# Patient Record
Sex: MALE | Race: WHITE | Employment: FULL TIME | URBAN - METROPOLITAN AREA
[De-identification: names, ages, dates, MRNs, and addresses within clinical notes are randomized per-mention and may not be internally consistent; named-entity substitution may affect disease eponyms.]

---

## 2019-12-25 ENCOUNTER — HOSPITAL ENCOUNTER (EMERGENCY)
Age: 49
Discharge: HOME | End: 2019-12-25
Attending: EMERGENCY MEDICINE
Payer: COMMERCIAL

## 2019-12-25 ENCOUNTER — APPOINTMENT (OUTPATIENT)
Dept: CT IMAGING | Age: 49
End: 2019-12-25
Payer: COMMERCIAL

## 2019-12-25 VITALS
HEIGHT: 75 IN | WEIGHT: 235 LBS | BODY MASS INDEX: 29.22 KG/M2 | TEMPERATURE: 97.8 F | RESPIRATION RATE: 16 BRPM | SYSTOLIC BLOOD PRESSURE: 140 MMHG | OXYGEN SATURATION: 98 % | DIASTOLIC BLOOD PRESSURE: 91 MMHG | HEART RATE: 100 BPM

## 2019-12-25 DIAGNOSIS — N20.1 URETERIC STONE: Primary | ICD-10-CM

## 2019-12-25 LAB
ALBUMIN SERPL-MCNC: 4.6 G/DL (ref 3.5–5.2)
ALP BLD-CCNC: 75 U/L (ref 40–129)
ALT SERPL-CCNC: 26 U/L (ref 0–40)
ANION GAP SERPL CALCULATED.3IONS-SCNC: 10 MMOL/L (ref 7–16)
AST SERPL-CCNC: 22 U/L (ref 0–39)
BACTERIA: NORMAL /HPF
BASOPHILS ABSOLUTE: 0.03 E9/L (ref 0–0.2)
BASOPHILS RELATIVE PERCENT: 0.3 % (ref 0–2)
BILIRUB SERPL-MCNC: 0.8 MG/DL (ref 0–1.2)
BILIRUBIN URINE: NEGATIVE
BLOOD, URINE: ABNORMAL
BUN BLDV-MCNC: 17 MG/DL (ref 6–20)
CALCIUM SERPL-MCNC: 10.1 MG/DL (ref 8.6–10.2)
CHLORIDE BLD-SCNC: 101 MMOL/L (ref 98–107)
CLARITY: CLEAR
CO2: 26 MMOL/L (ref 22–29)
COLOR: YELLOW
CREAT SERPL-MCNC: 1.4 MG/DL (ref 0.7–1.2)
EOSINOPHILS ABSOLUTE: 0.04 E9/L (ref 0.05–0.5)
EOSINOPHILS RELATIVE PERCENT: 0.4 % (ref 0–6)
GFR AFRICAN AMERICAN: >60
GFR NON-AFRICAN AMERICAN: 54 ML/MIN/1.73
GLUCOSE BLD-MCNC: 124 MG/DL (ref 74–99)
GLUCOSE URINE: NEGATIVE MG/DL
HCT VFR BLD CALC: 48.5 % (ref 37–54)
HEMOGLOBIN: 16.5 G/DL (ref 12.5–16.5)
IMMATURE GRANULOCYTES #: 0.03 E9/L
IMMATURE GRANULOCYTES %: 0.3 % (ref 0–5)
INFLUENZA A BY PCR: NOT DETECTED
INFLUENZA B BY PCR: NOT DETECTED
KETONES, URINE: NEGATIVE MG/DL
LEUKOCYTE ESTERASE, URINE: NEGATIVE
LYMPHOCYTES ABSOLUTE: 0.81 E9/L (ref 1.5–4)
LYMPHOCYTES RELATIVE PERCENT: 7.8 % (ref 20–42)
MCH RBC QN AUTO: 31.3 PG (ref 26–35)
MCHC RBC AUTO-ENTMCNC: 34 % (ref 32–34.5)
MCV RBC AUTO: 92 FL (ref 80–99.9)
MONOCYTES ABSOLUTE: 0.42 E9/L (ref 0.1–0.95)
MONOCYTES RELATIVE PERCENT: 4 % (ref 2–12)
NEUTROPHILS ABSOLUTE: 9.09 E9/L (ref 1.8–7.3)
NEUTROPHILS RELATIVE PERCENT: 87.2 % (ref 43–80)
NITRITE, URINE: NEGATIVE
PDW BLD-RTO: 12.9 FL (ref 11.5–15)
PH UA: 6 (ref 5–9)
PLATELET # BLD: 249 E9/L (ref 130–450)
PMV BLD AUTO: 10.2 FL (ref 7–12)
POTASSIUM SERPL-SCNC: 4 MMOL/L (ref 3.5–5)
PROTEIN UA: NEGATIVE MG/DL
RBC # BLD: 5.27 E12/L (ref 3.8–5.8)
RBC UA: NORMAL /HPF (ref 0–2)
SODIUM BLD-SCNC: 137 MMOL/L (ref 132–146)
SPECIFIC GRAVITY UA: <=1.005 (ref 1–1.03)
TOTAL PROTEIN: 7.4 G/DL (ref 6.4–8.3)
UROBILINOGEN, URINE: 0.2 E.U./DL
WBC # BLD: 10.4 E9/L (ref 4.5–11.5)
WBC UA: NORMAL /HPF (ref 0–5)

## 2019-12-25 PROCEDURE — 80053 COMPREHEN METABOLIC PANEL: CPT

## 2019-12-25 PROCEDURE — 2580000003 HC RX 258: Performed by: EMERGENCY MEDICINE

## 2019-12-25 PROCEDURE — 85025 COMPLETE CBC W/AUTO DIFF WBC: CPT

## 2019-12-25 PROCEDURE — 74176 CT ABD & PELVIS W/O CONTRAST: CPT

## 2019-12-25 PROCEDURE — 87088 URINE BACTERIA CULTURE: CPT

## 2019-12-25 PROCEDURE — 87502 INFLUENZA DNA AMP PROBE: CPT

## 2019-12-25 PROCEDURE — 99284 EMERGENCY DEPT VISIT MOD MDM: CPT

## 2019-12-25 PROCEDURE — 36415 COLL VENOUS BLD VENIPUNCTURE: CPT

## 2019-12-25 PROCEDURE — 81001 URINALYSIS AUTO W/SCOPE: CPT

## 2019-12-25 RX ORDER — 0.9 % SODIUM CHLORIDE 0.9 %
1000 INTRAVENOUS SOLUTION INTRAVENOUS ONCE
Status: COMPLETED | OUTPATIENT
Start: 2019-12-25 | End: 2019-12-25

## 2019-12-25 RX ORDER — IBUPROFEN 800 MG/1
800 TABLET ORAL EVERY 8 HOURS PRN
Qty: 21 TABLET | Refills: 0 | Status: SHIPPED | OUTPATIENT
Start: 2019-12-25 | End: 2022-06-14 | Stop reason: ALTCHOICE

## 2019-12-25 RX ORDER — SODIUM CHLORIDE 0.9 % (FLUSH) 0.9 %
10 SYRINGE (ML) INJECTION PRN
Status: DISCONTINUED | OUTPATIENT
Start: 2019-12-25 | End: 2019-12-25 | Stop reason: HOSPADM

## 2019-12-25 RX ORDER — ONDANSETRON 4 MG/1
4 TABLET, ORALLY DISINTEGRATING ORAL EVERY 8 HOURS PRN
Qty: 10 TABLET | Refills: 0 | Status: SHIPPED | OUTPATIENT
Start: 2019-12-25 | End: 2020-03-03

## 2019-12-25 RX ORDER — TAMSULOSIN HYDROCHLORIDE 0.4 MG/1
0.4 CAPSULE ORAL DAILY
Qty: 14 CAPSULE | Refills: 0 | Status: SHIPPED | OUTPATIENT
Start: 2019-12-25 | End: 2020-03-03

## 2019-12-25 RX ADMIN — SODIUM CHLORIDE 1000 ML: 9 INJECTION, SOLUTION INTRAVENOUS at 17:43

## 2019-12-25 ASSESSMENT — ENCOUNTER SYMPTOMS
EYE DISCHARGE: 0
SINUS PRESSURE: 0
ABDOMINAL PAIN: 1
SHORTNESS OF BREATH: 0
NAUSEA: 0
WHEEZING: 0
SORE THROAT: 0
EYE PAIN: 0
BACK PAIN: 0
DIARRHEA: 0
COUGH: 0
CONSTIPATION: 0
BELCHING: 0
EYE REDNESS: 0
VOMITING: 0

## 2019-12-28 LAB — URINE CULTURE, ROUTINE: NORMAL

## 2020-01-02 ENCOUNTER — HOSPITAL ENCOUNTER (OUTPATIENT)
Age: 50
Discharge: HOME | End: 2020-01-04
Payer: COMMERCIAL

## 2020-01-02 LAB
CREAT SERPL-MCNC: 1.1 MG/DL (ref 0.7–1.2)
GFR AFRICAN AMERICAN: >60
GFR NON-AFRICAN AMERICAN: >60 ML/MIN/1.73

## 2020-01-02 PROCEDURE — 82565 ASSAY OF CREATININE: CPT

## 2020-03-03 ENCOUNTER — OFFICE VISIT (OUTPATIENT)
Dept: FAMILY MEDICINE CLINIC | Age: 50
End: 2020-03-03
Payer: COMMERCIAL

## 2020-03-03 VITALS
BODY MASS INDEX: 28.7 KG/M2 | SYSTOLIC BLOOD PRESSURE: 122 MMHG | DIASTOLIC BLOOD PRESSURE: 62 MMHG | WEIGHT: 230.8 LBS | OXYGEN SATURATION: 96 % | HEART RATE: 106 BPM | HEIGHT: 75 IN | TEMPERATURE: 98.3 F

## 2020-03-03 LAB
INFLUENZA A ANTIBODY: NEGATIVE
INFLUENZA B ANTIBODY: NEGATIVE

## 2020-03-03 PROCEDURE — 87804 INFLUENZA ASSAY W/OPTIC: CPT | Performed by: PHYSICIAN ASSISTANT

## 2020-03-03 PROCEDURE — 99213 OFFICE O/P EST LOW 20 MIN: CPT | Performed by: PHYSICIAN ASSISTANT

## 2020-03-03 RX ORDER — OSELTAMIVIR PHOSPHATE 75 MG/1
75 CAPSULE ORAL DAILY
Qty: 14 CAPSULE | Refills: 0 | Status: SHIPPED | OUTPATIENT
Start: 2020-03-03 | End: 2020-03-17

## 2020-03-03 RX ORDER — CEFUROXIME AXETIL 250 MG/1
250 TABLET ORAL 2 TIMES DAILY
Qty: 20 TABLET | Refills: 0 | Status: SHIPPED | OUTPATIENT
Start: 2020-03-03 | End: 2020-03-13

## 2020-03-03 ASSESSMENT — ENCOUNTER SYMPTOMS
COUGH: 1
SORE THROAT: 1
SINUS PAIN: 1
EYES NEGATIVE: 1
GASTROINTESTINAL NEGATIVE: 1
SINUS PRESSURE: 1

## 2020-03-03 NOTE — PROGRESS NOTES
Chief Complaint   Patient presents with    Chills    Fever     101.4 last night    Cough    Fatigue    Other     brother hs the flu and they spent sunday together        HPI:  Patient presents today for sore throat ,fever, cough, achiness for 1 day. Brother had the flu and  spent Sunday together. Current Outpatient Medications:     cefUROXime (CEFTIN) 250 MG tablet, Take 1 tablet by mouth 2 times daily for 10 days, Disp: 20 tablet, Rfl: 0    oseltamivir (TAMIFLU) 75 MG capsule, Take 1 capsule by mouth daily for 14 days, Disp: 14 capsule, Rfl: 0    ibuprofen (IBU) 800 MG tablet, Take 1 tablet by mouth every 8 hours as needed for Pain, Disp: 21 tablet, Rfl: 0    aspirin-acetaminophen-caffeine (EXCEDRIN MIGRAINE) 250-250-65 MG per tablet, Take 2 tablets by mouth once.  , Disp: , Rfl:        Allergies   Allergen Reactions    Other Swelling     bees         Review of Systems  Review of Systems   Constitutional: Positive for chills, fatigue and fever. HENT: Positive for congestion, sinus pressure, sinus pain and sore throat. Eyes: Negative. Respiratory: Positive for cough. Cardiovascular: Negative. Gastrointestinal: Negative. VS:  /62 (Site: Right Upper Arm, Position: Sitting)   Pulse 106   Temp 98.3 °F (36.8 °C) (Temporal)   Ht 6' 3\" (1.905 m)   Wt 230 lb 12.8 oz (104.7 kg)   SpO2 96%   BMI 28.85 kg/m²     Patient's medical, social, and family history reviewed      Physical Exam  Physical Exam  Vitals signs and nursing note reviewed. Constitutional:       General: He is not in acute distress. Appearance: Normal appearance. He is normal weight. He is not toxic-appearing. HENT:      Head: Normocephalic.       Right Ear: Tympanic membrane, ear canal and external ear normal.      Left Ear: Tympanic membrane, ear canal and external ear normal.      Nose: Nose normal.      Mouth/Throat:      Mouth: Mucous membranes are moist.      Pharynx: Oropharyngeal exudate and

## 2022-06-06 ENCOUNTER — TELEPHONE (OUTPATIENT)
Dept: PRIMARY CARE CLINIC | Age: 52
End: 2022-06-06

## 2022-06-06 NOTE — TELEPHONE ENCOUNTER
----- Message from Eris Hussein sent at 2022 12:41 PM EDT -----  Subject: Appointment Request    Reason for Call: New Patient Request Appointment    QUESTIONS  Type of Appointment? New Patient/New to Provider  Reason for appointment request? No appointments available during search  Additional Information for Provider? Patient's sons are patients of Dr. Cate Foster and Anne Felix. Jennifer Rodriguez is their dad and wants to establish   with Dr. Vj Lynne also. Not seeing any availability. Please let him know if   this is possible.   ---------------------------------------------------------------------------  --------------  CALL BACK INFO  What is the best way for the office to contact you? OK to leave message on   voicemail  Preferred Call Back Phone Number? [phone]  ---------------------------------------------------------------------------  --------------  SCRIPT ANSWERS  Relationship to Patient? Self  Specialty Confirmation? Primary Care  Is this the first appointment to establish care for a ? No  Have you been diagnosed with, awaiting test results for, or told that you   are suspected of having COVID-19 (Coronavirus)? (If patient has tested   negative or was tested as a requirement for work, school, or travel and   not based on symptoms, answer no)? No  Within the past 10 days have you developed any of the following symptoms   (answer no if symptoms have been present longer than 10 days or began   more than 10 days ago)? Fever or Chills, Cough, Shortness of breath or   difficulty breathing, Loss of taste or smell, Sore throat, Nasal   congestion, Sneezing or runny nose, Fatigue or generalized body aches   (answer no if pain is specific to a body part e.g. back pain), Diarrhea,   Headache? No  Have you had close contact with someone with COVID-19 in the last 7 days? No  (Service Expert  click yes below to proceed with [email or web address] As Usual   Scheduling)?  Yes

## 2022-06-14 ENCOUNTER — OFFICE VISIT (OUTPATIENT)
Dept: PRIMARY CARE CLINIC | Age: 52
End: 2022-06-14
Payer: COMMERCIAL

## 2022-06-14 VITALS
WEIGHT: 230 LBS | TEMPERATURE: 98.1 F | HEIGHT: 75 IN | HEART RATE: 75 BPM | SYSTOLIC BLOOD PRESSURE: 128 MMHG | DIASTOLIC BLOOD PRESSURE: 74 MMHG | BODY MASS INDEX: 28.6 KG/M2 | OXYGEN SATURATION: 98 %

## 2022-06-14 DIAGNOSIS — R10.32 LEFT GROIN PAIN: ICD-10-CM

## 2022-06-14 DIAGNOSIS — Z12.5 SCREENING FOR MALIGNANT NEOPLASM OF PROSTATE: ICD-10-CM

## 2022-06-14 DIAGNOSIS — K40.90 LEFT INGUINAL HERNIA: ICD-10-CM

## 2022-06-14 DIAGNOSIS — Z00.01 ENCOUNTER FOR GENERAL ADULT MEDICAL EXAMINATION WITH ABNORMAL FINDINGS: Primary | ICD-10-CM

## 2022-06-14 DIAGNOSIS — Z12.11 SCREENING FOR MALIGNANT NEOPLASM OF COLON: ICD-10-CM

## 2022-06-14 PROCEDURE — 99396 PREV VISIT EST AGE 40-64: CPT | Performed by: FAMILY MEDICINE

## 2022-06-14 ASSESSMENT — ENCOUNTER SYMPTOMS
ABDOMINAL PAIN: 0
COLOR CHANGE: 0
DIARRHEA: 0
CHEST TIGHTNESS: 0
RHINORRHEA: 0
WHEEZING: 0
SHORTNESS OF BREATH: 0
NAUSEA: 0
SINUS PRESSURE: 0
COUGH: 0
EYE PAIN: 0
EYE REDNESS: 0
SORE THROAT: 0
BACK PAIN: 0
CONSTIPATION: 0
BLOOD IN STOOL: 0
EYE ITCHING: 0
VOMITING: 0
APNEA: 0

## 2022-06-14 ASSESSMENT — PATIENT HEALTH QUESTIONNAIRE - PHQ9
SUM OF ALL RESPONSES TO PHQ QUESTIONS 1-9: 0
SUM OF ALL RESPONSES TO PHQ QUESTIONS 1-9: 0
SUM OF ALL RESPONSES TO PHQ9 QUESTIONS 1 & 2: 0
1. LITTLE INTEREST OR PLEASURE IN DOING THINGS: 0
SUM OF ALL RESPONSES TO PHQ QUESTIONS 1-9: 0
SUM OF ALL RESPONSES TO PHQ QUESTIONS 1-9: 0
2. FEELING DOWN, DEPRESSED OR HOPELESS: 0

## 2022-06-14 NOTE — PROGRESS NOTES
Chief Complaint:     Chief Complaint   Patient presents with    Annual Exam    New Patient     Feels well  Healthy  Appetite good  No change in bowel or bladder function  Vaccines UTD    Groin Pain  The patient's primary symptoms include pelvic pain. The patient's pertinent negatives include no genital itching, genital lesions or scrotal swelling. This is a recurrent problem. The current episode started more than 1 month ago. The problem occurs intermittently. The problem has been waxing and waning. The pain is mild. Pertinent negatives include no abdominal pain, chest pain, chills, constipation, coughing, diarrhea, discolored urine, dysuria, fever, frequency, headaches, hematuria, hesitancy, joint pain, joint swelling, nausea, rash, shortness of breath, sore throat, urgency, urinary retention or vomiting. The symptoms are aggravated by heavy lifting and activity. He has tried nothing for the symptoms. The treatment provided no relief. He is sexually active. There is no problem list on file for this patient. Past Medical History:   Diagnosis Date    Migraines        Past Surgical History:   Procedure Laterality Date    VASECTOMY         No current outpatient medications on file. No current facility-administered medications for this visit.        Allergies   Allergen Reactions    Other Swelling     bees       Social History     Socioeconomic History    Marital status:      Spouse name: None    Number of children: None    Years of education: None    Highest education level: None   Occupational History    None   Tobacco Use    Smoking status: Never Smoker    Smokeless tobacco: Never Used   Vaping Use    Vaping Use: Never used   Substance and Sexual Activity    Alcohol use: Yes     Comment: occasional    Drug use: No    Sexual activity: Yes     Partners: Male   Other Topics Concern    None   Social History Narrative    None     Social Determinants of Health     Financial Resource Strain:     Difficulty of Paying Living Expenses: Not on file   Food Insecurity:     Worried About Running Out of Food in the Last Year: Not on file    [place] of Food in the Last Year: Not on file   Transportation Needs:     Lack of Transportation (Medical): Not on file    Lack of Transportation (Non-Medical): Not on file   Physical Activity:     Days of Exercise per Week: Not on file    Minutes of Exercise per Session: Not on file   Stress:     Feeling of Stress : Not on file   Social Connections:     Frequency of Communication with Friends and Family: Not on file    Frequency of Social Gatherings with Friends and Family: Not on file    Attends Worship Services: Not on file    Active Member of [address] [place] [address] or Organizations: Not on file    Attends Club or Organization Meetings: Not on file    Marital Status: Not on file   Intimate Partner Violence:     Fear of Current or Ex-Partner: Not on file    Emotionally Abused: Not on file    Physically Abused: Not on file    Sexually Abused: Not on file   Housing Stability:     Unable to Pay for Housing in the Last Year: Not on file    Number of [place] in the Last Year: Not on file    Unstable Housing in the Last Year: Not on file       History reviewed. No pertinent family history. Review of Systems   Constitutional: Negative for activity change, appetite change, chills, fatigue and fever. HENT: Negative for congestion, ear pain, hearing loss, nosebleeds, rhinorrhea, sinus pressure and sore throat. Eyes: Negative for pain, redness, itching and visual disturbance. Respiratory: Negative for apnea, cough, chest tightness, shortness of breath and wheezing. Cardiovascular: Negative for chest pain, palpitations and leg swelling. Gastrointestinal: Negative for abdominal pain, blood in stool, constipation, diarrhea, nausea and vomiting. Endocrine: Negative. Genitourinary: Positive for pelvic pain.  Negative for decreased urine volume, difficulty urinating, dysuria, frequency, hematuria, hesitancy, scrotal swelling and urgency. Musculoskeletal: Negative for arthralgias, back pain, gait problem, joint pain, myalgias and neck pain. Skin: Negative for color change and rash. Allergic/Immunologic: Negative for environmental allergies and food allergies. Neurological: Negative for dizziness, weakness, light-headedness, numbness and headaches. Hematological: Negative for adenopathy. Does not bruise/bleed easily. Psychiatric/Behavioral: Negative for behavioral problems, dysphoric mood and sleep disturbance. The patient is not nervous/anxious and is not hyperactive. All other systems reviewed and are negative. /74   Pulse 75   Temp 98.1 °F (36.7 °C)   Ht 6' 3\" (1.905 m)   Wt 230 lb (104.3 kg)   SpO2 98%   BMI 28.75 kg/m²     Physical Exam  Vitals and nursing note reviewed. Constitutional:       General: He is not in acute distress. Appearance: Normal appearance. He is well-developed. HENT:      Head: Normocephalic and atraumatic. Right Ear: Hearing, tympanic membrane and external ear normal. No tenderness. No middle ear effusion. Left Ear: Hearing, tympanic membrane and external ear normal. No tenderness. No middle ear effusion. Nose: Nose normal. No congestion or rhinorrhea. Right Turbinates: Not enlarged. Left Turbinates: Not enlarged. Mouth/Throat:      Mouth: Mucous membranes are moist.      Tongue: No lesions. Pharynx: Oropharynx is clear. No oropharyngeal exudate or posterior oropharyngeal erythema. Eyes:      General: No scleral icterus. Conjunctiva/sclera: Conjunctivae normal.      Pupils: Pupils are equal, round, and reactive to light. Neck:      Thyroid: No thyromegaly. Cardiovascular:      Rate and Rhythm: Normal rate and regular rhythm. Heart sounds: Normal heart sounds. No murmur heard.       Pulmonary:      Effort: Pulmonary effort is normal. No respiratory distress. Breath sounds: Normal breath sounds. No wheezing or rales. Abdominal:      General: Bowel sounds are normal. There is no distension. Palpations: Abdomen is soft. Tenderness: There is no abdominal tenderness. Musculoskeletal:         General: No tenderness. Normal range of motion. Cervical back: Normal range of motion and neck supple. No rigidity. No muscular tenderness. Lymphadenopathy:      Cervical: No cervical adenopathy. Skin:     General: Skin is warm and dry. Findings: No erythema or rash. Neurological:      General: No focal deficit present. Mental Status: He is alert and oriented to person, place, and time. Cranial Nerves: No cranial nerve deficit. Deep Tendon Reflexes: Reflexes are normal and symmetric. Reflexes normal.   Psychiatric:         Mood and Affect: Mood normal.                                 ASSESSMENT/PLAN:    There is no problem list on file for this patient. Sergio Alvarez was seen today for annual exam and new patient. Diagnoses and all orders for this visit:    Encounter for general adult medical examination with abnormal findings  -     Comprehensive Metabolic Panel; Future  -     Lipid Panel; Future    Screening for malignant neoplasm of colon  -     [address], MD, General Surgery, [facility]    Left groin pain    Left inguinal hernia  -     [address], MD, General Surgery, [facility]    Screening for malignant neoplasm of prostate  -     PSA Screening; Future          Return in about 1 year (around 6/14/2023) for Annual Exam.      I spent 30 minutes with this patient. I spent greater than 50% of the time counseling this patient.         Pavan Toussaint DO  6/14/2022  1:13 PM

## 2022-06-27 DIAGNOSIS — Z00.01 ENCOUNTER FOR GENERAL ADULT MEDICAL EXAMINATION WITH ABNORMAL FINDINGS: ICD-10-CM

## 2022-06-27 DIAGNOSIS — Z12.5 SCREENING FOR MALIGNANT NEOPLASM OF PROSTATE: ICD-10-CM

## 2022-06-27 LAB
ALBUMIN SERPL-MCNC: 4.6 G/DL (ref 3.5–5.2)
ALP BLD-CCNC: 83 U/L (ref 40–129)
ALT SERPL-CCNC: 18 U/L (ref 0–40)
ANION GAP SERPL CALCULATED.3IONS-SCNC: 12 MMOL/L (ref 7–16)
AST SERPL-CCNC: 19 U/L (ref 0–39)
BILIRUB SERPL-MCNC: 0.7 MG/DL (ref 0–1.2)
BUN BLDV-MCNC: 19 MG/DL (ref 6–20)
CALCIUM SERPL-MCNC: 9.5 MG/DL (ref 8.6–10.2)
CHLORIDE BLD-SCNC: 106 MMOL/L (ref 98–107)
CHOLESTEROL, TOTAL: 145 MG/DL (ref 0–199)
CO2: 23 MMOL/L (ref 22–29)
CREAT SERPL-MCNC: 1.1 MG/DL (ref 0.7–1.2)
GFR AFRICAN AMERICAN: >60
GFR NON-AFRICAN AMERICAN: >60 ML/MIN/1.73
GLUCOSE BLD-MCNC: 90 MG/DL (ref 74–99)
HDLC SERPL-MCNC: 44 MG/DL
LDL CHOLESTEROL CALCULATED: 82 MG/DL (ref 0–99)
POTASSIUM SERPL-SCNC: 4.3 MMOL/L (ref 3.5–5)
PROSTATE SPECIFIC ANTIGEN: 3.51 NG/ML (ref 0–4)
SODIUM BLD-SCNC: 141 MMOL/L (ref 132–146)
TOTAL PROTEIN: 7.2 G/DL (ref 6.4–8.3)
TRIGL SERPL-MCNC: 95 MG/DL (ref 0–149)
VLDLC SERPL CALC-MCNC: 19 MG/DL

## 2022-06-30 DIAGNOSIS — R97.20 ELEVATED PSA: Primary | ICD-10-CM

## 2022-07-26 ENCOUNTER — OFFICE VISIT (OUTPATIENT)
Dept: SURGERY | Age: 52
End: 2022-07-26
Payer: COMMERCIAL

## 2022-07-26 VITALS
RESPIRATION RATE: 18 BRPM | BODY MASS INDEX: 28.35 KG/M2 | TEMPERATURE: 97.3 F | SYSTOLIC BLOOD PRESSURE: 124 MMHG | HEIGHT: 75 IN | OXYGEN SATURATION: 99 % | DIASTOLIC BLOOD PRESSURE: 82 MMHG | HEART RATE: 84 BPM | WEIGHT: 228 LBS

## 2022-07-26 DIAGNOSIS — Z12.11 ENCOUNTER FOR SCREENING COLONOSCOPY: ICD-10-CM

## 2022-07-26 DIAGNOSIS — K40.20 NON-RECURRENT BILATERAL INGUINAL HERNIA WITHOUT OBSTRUCTION OR GANGRENE: Primary | ICD-10-CM

## 2022-07-26 PROCEDURE — 99203 OFFICE O/P NEW LOW 30 MIN: CPT | Performed by: SURGERY

## 2022-07-26 NOTE — PROGRESS NOTES
[facility] Surgery Clinic Note    Assessment/Plan:      Diagnosis Orders   1. Non-recurrent bilateral inguinal hernia without obstruction or gangrene      We will plan for repair      2. Encounter for screening colonoscopy      Plan for colonoscopy. Left inguinal hernia likely colon containing. We will fix hernias prior to colonoscopy to decrease risk of incomplete colonoscopy            Return for Surgery, Colonoscopy. Chief Complaint   Patient presents with    New Patient     Colonoscopy consult        PCP: Monie Garcia DO    HPI: Lb Atwood is a 47 y.o. male who presents in consultation for evaluation of left inguinal hernia. He thinks he has had it for about 6 to 8 months. He felt some discomfort there. Last couple months he is felt a bulge. He thinks the other side is starting with similar symptoms but denies any bulging. He says the left side will reduce if he lays down he can put pressure on it. Activity makes his discomfort worse. He is also due for colonoscopy. He has not had one previously. He denies any issues moving his bowels. He has no diarrhea or constipation. There is no melena or hematochezia. He has no abdominal pain or unintentional weight loss aside from the discomfort from his hernia. There is no family history of colon cancer or inflammatory bowel disease.       Past Medical History:   Diagnosis Date    Migraines        Past Surgical History:   Procedure Laterality Date    VASECTOMY         Prior to Admission medications    Not on File       Allergies   Allergen Reactions    Other Swelling     bees       Social History     Socioeconomic History    Marital status:      Spouse name: None    Number of children: None    Years of education: None    Highest education level: None   Tobacco Use    Smoking status: Never    Smokeless tobacco: Never   Vaping Use    Vaping Use: Never used   Substance and Sexual Activity    Alcohol use: Yes     Comment: occasional    Drug use: No    Sexual activity: Yes     Partners: Male       No family history on file. Review of Systems   All other systems reviewed and are negative. Objective:  Vitals:    07/26/22 1243   BP: 124/82   Pulse: 84   Resp: 18   Temp: 97.3 °F (36.3 °C)   TempSrc: Temporal   SpO2: 99%   Weight: 228 lb (103.4 kg)   Height: 6' 3\" (1.905 m)          Physical Exam  Constitutional:       General: He is not in acute distress. Appearance: He is not diaphoretic. HENT:      Head: Normocephalic and atraumatic. Eyes:      General:         Right eye: No discharge. Left eye: No discharge. Neck:      Trachea: No tracheal deviation. Cardiovascular:      Rate and Rhythm: Normal rate. Pulmonary:      Effort: Pulmonary effort is normal. No respiratory distress. Abdominal:      General: There is no distension. Palpations: Abdomen is soft. Tenderness: There is no abdominal tenderness. There is no guarding or rebound. Comments: Bilateral inguinal hernia, left greater than right. Likely colon containing on the left   Skin:     General: Skin is warm and dry. Neurological:      Mental Status: He is alert and oriented to person, place, and time. Lan Leo MD      NOTE: This report, in part or full,may have been transcribed using voice recognition software. Every effort was made to ensure accuracy; however, inadvertent computerized transcription errors may be present. Please excuse any transcriptional grammatical or spelling errors that may have escaped my editorial review.     CC: Iva Kong,

## 2022-07-28 ENCOUNTER — TELEPHONE (OUTPATIENT)
Dept: SURGERY | Age: 52
End: 2022-07-28

## 2022-07-28 ENCOUNTER — HOSPITAL ENCOUNTER (EMERGENCY)
Age: 52
Discharge: HOME | End: 2022-07-29
Attending: STUDENT IN AN ORGANIZED HEALTH CARE EDUCATION/TRAINING PROGRAM
Payer: COMMERCIAL

## 2022-07-28 DIAGNOSIS — F10.920 ACUTE ALCOHOLIC INTOXICATION WITHOUT COMPLICATION (HCC): Primary | ICD-10-CM

## 2022-07-28 LAB
CHP ED QC CHECK: YES
GLUCOSE BLD-MCNC: 114 MG/DL
METER GLUCOSE: 114 MG/DL (ref 74–99)

## 2022-07-28 PROCEDURE — 99284 EMERGENCY DEPT VISIT MOD MDM: CPT

## 2022-07-28 PROCEDURE — 82962 GLUCOSE BLOOD TEST: CPT

## 2022-07-28 NOTE — TELEPHONE ENCOUNTER
Houston Gordon is scheduled for laparoscopic robotic assisted bilateral inguinal repair with mesh with Dr Tanisha Palmer on 08-17-22 at [facility] at 8:30 am. Patient was told to arrive at 6:30 am. Patient needs to be NPO after midnight the night before procedure. All surgery instructions were explained to the patient and a surgery letter was also mailed out. MA informed patient that PAT will also be calling to review pre-op instructions and medications. Patient verbalized understanding.   Electronically signed by Vero Clemons MA on 7/28/2022 at 10:05 AM

## 2022-07-28 NOTE — TELEPHONE ENCOUNTER
Renelle Romberg is scheduled for colonoscopy with Dr Nirmal Siu on 11-2-22 at [facility] at 11:00 am. Patient was told to arrive at 10:00 am. Patient needs to be NPO after midnight the night before procedure. All surgery instructions were explained to the patient and a surgery letter was also mailed out. MA informed patient that PAT will also be calling to review pre-op instructions and medications. Patient verbalized understanding.   Electronically signed by Donny Ortega MA on 7/28/2022 at 10:31 AM

## 2022-07-28 NOTE — TELEPHONE ENCOUNTER
Markus Kitchen is scheduled for colonoscopy with Dr Donita Peña on 11-29-22 at [facility] at 11:00 am. Patient was told to arrive at 10:00 am. Patient needs to be NPO after midnight the night before procedure. All surgery instructions were explained to the patient and a surgery letter was also mailed out. MA informed patient that PAT will also be calling to review pre-op instructions and medications. Patient verbalized understanding.   Electronically signed by Radha Mari MA on 7/28/2022 at 10:48 AM

## 2022-07-29 VITALS
WEIGHT: 200 LBS | DIASTOLIC BLOOD PRESSURE: 83 MMHG | TEMPERATURE: 97.6 F | HEIGHT: 72 IN | HEART RATE: 78 BPM | RESPIRATION RATE: 14 BRPM | OXYGEN SATURATION: 95 % | SYSTOLIC BLOOD PRESSURE: 113 MMHG | BODY MASS INDEX: 27.09 KG/M2

## 2022-07-29 NOTE — ED PROVIDER NOTES
ED  Provider Note  Admit Date/RoomTime: 7/28/2022  8:57 PM  ED Room: 13/13      History of Present Illness:  7/28/22, Time: 9:08 PM EDT  Chief Complaint   Patient presents with    Alcohol Intoxication     Pt presents to ED intoxicated and responsive to pain. According to family pt was drinking heavily today and was out in the sun. Taryn Crowe is a 47 y.o. male presenting to the ED for alcohol intoxication. Patient easily arousable now, arouses to verbal, states that he drank too much to dose today while golfing, concern for dehydration. Denies any coingestions, no drugs including marijuana. Denies any fall or head strike, no focal weakness or numbness, no neck pain or stiffness, no chest pain or shortness of breath, no abdominal pain, no nausea or vomiting, no diarrhea or constipation. .  Exacerbated by excessive alcohol consumption, relieved by none. Review of Systems:   A complete review of systems was performed and pertinent positives and negatives are stated within HPI, all other systems reviewed and are negative.        --------------------------------------------- PATIENT HISTORY--------------------------------------------  Past Medical History:  has a past medical history of Migraines. Past Surgical History:  has a past surgical history that includes Vasectomy. Family History: family history is not on file. . Unless otherwise noted, family history is non contributory    Social History:  reports that he has never smoked. He has never used smokeless tobacco. He reports current alcohol use. He reports that he does not use drugs. The patients home medications have been reviewed. Allergies:  Other    I have reviewed the past medical history, past surgical history, social history, and family history    ---------------------------------------------------PHYSICAL EXAM--------------------------------------    Constitutional/General: Alert and oriented x3, arousable to voice   Head: Normocephalic and atraumatic  Eyes: PERRL, EOMI, sclera non icteric  ENT: Oropharynx clear, handling secretions, no trismus  Neck: Supple, full ROM, no stridor, no meningismus  Respiratory: lctab  Cardiovascular: RRR, no R/G/M, 2+ peripheral pulses  Chest: No chest wall tenderness, equal chest rise  Gastrointestinal:  sntnd  Musculoskeletal: Extremities warm and well perfused, moving all extremities  Skin: skin warm and dry. No rashes. Neurologic: No focal deficits, strength and sensation grossly intact   Psychiatric: Normal Affect, behavior normal           -------------------------------------------------- RESULTS -------------------------------------------------  I have personally reviewed all laboratory and imaging results for this patient. Results are listed below. LABS: (Lab results interpreted by me)  Results for orders placed or performed during the hospital encounter of 07/28/22   POCT glucose   Result Value Ref Range    Glucose 114 mg/dL    QC OK? yes    POCT Glucose   Result Value Ref Range    Meter Glucose 114 (H) 74 - 99 mg/dL   ,       RADIOLOGY:  Imaging interpreted by Radiologist unless otherwise specified  No orders to display       ------------------------- NURSING NOTES AND VITALS REVIEWED ---------------------------  The nursing notes within the ED encounter and vital signs as below have been reviewed by myself  /75   Pulse 75   Temp 97.6 °F (36.4 °C) (Temporal)   Resp 14   Ht 6' (1.829 m)   Wt 200 lb (90.7 kg)   SpO2 96%   BMI 27.12 kg/m²      The patients available past medical records and past encounters were reviewed. ------------------------------ ED COURSE/MEDICAL DECISION MAKING----------------------  Medications - No data to display    I, Dr. Orinda Cabot, am the primary provider of record    Medical Decision Making:   Alcohol intoxication. Vitals stable. No fall or trauma. Patient will be monitored while they metabolize their alcohol ingestion. BG wnl.  No co-ingestions or AMS. ED Counseling: This emergency provider has spoken with the patient and any family present to discuss clinical status, results, plan of care, diagnosis and prognosis as able to be determined at this time. Any questions were answered and patient and/or family/POA are agreeable with the plan.       --------------------------------- IMPRESSION AND DISPOSITION ---------------------------------    IMPRESSION  1. Acute alcoholic intoxication without complication ([facility] [address]        DISPOSITION  Disposition: Other Disposition: discharge to home  Patient condition is good      This report was transcribed using voice recognition software. Every effort was made to ensure accuracy; however, transcription errors may be present.          Pb Medel MD  07/29/22 8452

## 2022-07-29 NOTE — ED NOTES
Report to [facility] RN, care of patient relinquished.       Dorothy Belcher RN  07/28/22 6144

## 2022-08-15 NOTE — PROGRESS NOTES
[facility] PRE-ADMISSION TESTING INSTRUCTIONS    The Preadmission Testing patient is instructed accordingly using the following criteria (check applicable):    ARRIVAL INSTRUCTIONS:  [x] Parking the day of Surgery is located in the Main Entrance lot. Upon entering the door, make an immediate right to the surgery reception desk    [x] Bring photo ID and insurance card    [] Bring in a copy of Living will or Durable Power of  papers. [x] Please be sure to arrange for responsible adult to provide transportation to and from the hospital    [x] Please arrange for responsible adult to be with you for the 24 hour period post procedure due to having anesthesia    [x] If you awake am of surgery not feeling well or have temperature >100 please call [phone]    GENERAL INSTRUCTIONS:    [x] Nothing by mouth after midnight, including gum, candy, mints or water    [x] You may brush your teeth, but do not swallow any water    [] Take medications as instructed with 1-2 oz of water    [] Stop herbal supplements and vitamins 5 days prior to procedure    [] Follow preop dosing of blood thinners per physician instructions    [] Take 1/2 dose of evening insulin, but no insulin after midnight    [] No oral diabetic medications after midnight    [] If diabetic and have low blood sugar or feel symptomatic, take 1-2oz apple juice only    [] Bring inhalers day of surgery    [] Bring C-PAP/ Bi-Pap day of surgery    [] Bring urine specimen day of surgery    [x] Shower or bath with soap, lather and rinse well, AM of Surgery, no lotion, powders or creams to surgical site    [] Follow bowel prep as instructed per surgeon    [x] No tobacco products within 24 hours of surgery     [x] No alcohol or illegal drug use within 24 hours of surgery.     [x] Jewelry, body piercing's, eyeglasses, contact lenses and dentures are not permitted into surgery (bring cases)      [x] Please do not wear any nail polish, make up or hair products on the day of surgery    [x] You can expect a call the business day prior to procedure to notify you if your arrival time changes    [x] If you receive a survey after surgery we would greatly appreciate your comments    [] Parent/guardian of a minor must accompany their child and remain on the premises  the entire time they are under our care     [] Pediatric patients may bring favorite toy, blanket or comfort item with them    [] A caregiver or family member must remain with the patient during their stay if they are mentally handicapped, have dementia, disoriented or unable to use a call light or would be a safety concern if left unattended    [x] Please notify surgeon if you develop any illness between now and time of surgery (cold, cough, sore throat, fever, nausea, vomiting) or any signs of infections  including skin, wounds, and dental.    [x]  The Outpatient Pharmacy is available to fill your prescription here on your day of surgery, ask your preop nurse for details    [] Other instructions    EDUCATIONAL MATERIALS PROVIDED:    [] PAT Preoperative Education Packet/Booklet     [] Medication List    [] Transfusion bracelet applied with instructions    [] Shower with soap, lather and rinse well, and use CHG wipes provided the evening before surgery as instructed    [] Incentive spirometer with instructions

## 2022-08-16 ENCOUNTER — ANESTHESIA EVENT (OUTPATIENT)
Dept: OPERATING ROOM | Age: 52
End: 2022-08-16
Payer: COMMERCIAL

## 2022-08-17 ENCOUNTER — HOSPITAL ENCOUNTER (OUTPATIENT)
Age: 52
Setting detail: OUTPATIENT SURGERY
Discharge: HOME | End: 2022-08-17
Attending: SURGERY | Admitting: SURGERY
Payer: COMMERCIAL

## 2022-08-17 ENCOUNTER — ANESTHESIA (OUTPATIENT)
Dept: OPERATING ROOM | Age: 52
End: 2022-08-17
Payer: COMMERCIAL

## 2022-08-17 VITALS
WEIGHT: 220 LBS | SYSTOLIC BLOOD PRESSURE: 130 MMHG | OXYGEN SATURATION: 97 % | TEMPERATURE: 97.2 F | HEIGHT: 75 IN | RESPIRATION RATE: 16 BRPM | HEART RATE: 78 BPM | BODY MASS INDEX: 27.35 KG/M2 | DIASTOLIC BLOOD PRESSURE: 90 MMHG

## 2022-08-17 DIAGNOSIS — G89.18 POST-OP PAIN: Primary | ICD-10-CM

## 2022-08-17 PROCEDURE — 49650 LAP ING HERNIA REPAIR INIT: CPT | Performed by: SURGERY

## 2022-08-17 PROCEDURE — S2900 ROBOTIC SURGICAL SYSTEM: HCPCS | Performed by: SURGERY

## 2022-08-17 PROCEDURE — 2500000003 HC RX 250 WO HCPCS: Performed by: NURSE ANESTHETIST, CERTIFIED REGISTERED

## 2022-08-17 PROCEDURE — 7100000011 HC PHASE II RECOVERY - ADDTL 15 MIN: Performed by: SURGERY

## 2022-08-17 PROCEDURE — 3600000019 HC SURGERY ROBOT ADDTL 15MIN: Performed by: SURGERY

## 2022-08-17 PROCEDURE — 7100000010 HC PHASE II RECOVERY - FIRST 15 MIN: Performed by: SURGERY

## 2022-08-17 PROCEDURE — 2580000003 HC RX 258: Performed by: SURGERY

## 2022-08-17 PROCEDURE — 2709999900 HC NON-CHARGEABLE SUPPLY: Performed by: SURGERY

## 2022-08-17 PROCEDURE — 3700000000 HC ANESTHESIA ATTENDED CARE: Performed by: SURGERY

## 2022-08-17 PROCEDURE — C1781 MESH (IMPLANTABLE): HCPCS | Performed by: SURGERY

## 2022-08-17 PROCEDURE — 2500000003 HC RX 250 WO HCPCS: Performed by: SURGERY

## 2022-08-17 PROCEDURE — 3700000001 HC ADD 15 MINUTES (ANESTHESIA): Performed by: SURGERY

## 2022-08-17 PROCEDURE — 2580000003 HC RX 258: Performed by: NURSE ANESTHETIST, CERTIFIED REGISTERED

## 2022-08-17 PROCEDURE — 7100000000 HC PACU RECOVERY - FIRST 15 MIN: Performed by: SURGERY

## 2022-08-17 PROCEDURE — 3600000009 HC SURGERY ROBOT BASE: Performed by: SURGERY

## 2022-08-17 PROCEDURE — 6360000002 HC RX W HCPCS: Performed by: NURSE ANESTHETIST, CERTIFIED REGISTERED

## 2022-08-17 PROCEDURE — 6360000002 HC RX W HCPCS: Performed by: SURGERY

## 2022-08-17 PROCEDURE — 7100000001 HC PACU RECOVERY - ADDTL 15 MIN: Performed by: SURGERY

## 2022-08-17 DEVICE — MESH HERN LAP SELF FIXATING LT ANAT POLYLACTIC ACID PROGRIP: Type: IMPLANTABLE DEVICE | Site: INGUINAL | Status: FUNCTIONAL

## 2022-08-17 DEVICE — MESH HERN W15XL10CM TEXTILE MFIL POLYETH TEREPHTHALATE: Type: IMPLANTABLE DEVICE | Site: INGUINAL | Status: FUNCTIONAL

## 2022-08-17 RX ORDER — LABETALOL HYDROCHLORIDE 5 MG/ML
INJECTION, SOLUTION INTRAVENOUS PRN
Status: DISCONTINUED | OUTPATIENT
Start: 2022-08-17 | End: 2022-08-17 | Stop reason: SDUPTHER

## 2022-08-17 RX ORDER — PROPOFOL 10 MG/ML
INJECTION, EMULSION INTRAVENOUS PRN
Status: DISCONTINUED | OUTPATIENT
Start: 2022-08-17 | End: 2022-08-17 | Stop reason: SDUPTHER

## 2022-08-17 RX ORDER — KETOROLAC TROMETHAMINE 30 MG/ML
INJECTION, SOLUTION INTRAMUSCULAR; INTRAVENOUS PRN
Status: DISCONTINUED | OUTPATIENT
Start: 2022-08-17 | End: 2022-08-17 | Stop reason: SDUPTHER

## 2022-08-17 RX ORDER — ONDANSETRON 2 MG/ML
INJECTION INTRAMUSCULAR; INTRAVENOUS PRN
Status: DISCONTINUED | OUTPATIENT
Start: 2022-08-17 | End: 2022-08-17 | Stop reason: SDUPTHER

## 2022-08-17 RX ORDER — KETAMINE HYDROCHLORIDE 10 MG/ML
INJECTION, SOLUTION INTRAMUSCULAR; INTRAVENOUS PRN
Status: DISCONTINUED | OUTPATIENT
Start: 2022-08-17 | End: 2022-08-17 | Stop reason: SDUPTHER

## 2022-08-17 RX ORDER — SODIUM CHLORIDE 9 MG/ML
INJECTION, SOLUTION INTRAVENOUS CONTINUOUS PRN
Status: DISCONTINUED | OUTPATIENT
Start: 2022-08-17 | End: 2022-08-17 | Stop reason: SDUPTHER

## 2022-08-17 RX ORDER — MIDAZOLAM HYDROCHLORIDE 1 MG/ML
INJECTION INTRAMUSCULAR; INTRAVENOUS PRN
Status: DISCONTINUED | OUTPATIENT
Start: 2022-08-17 | End: 2022-08-17 | Stop reason: SDUPTHER

## 2022-08-17 RX ORDER — FENTANYL CITRATE 50 UG/ML
INJECTION, SOLUTION INTRAMUSCULAR; INTRAVENOUS PRN
Status: DISCONTINUED | OUTPATIENT
Start: 2022-08-17 | End: 2022-08-17 | Stop reason: SDUPTHER

## 2022-08-17 RX ORDER — NEOSTIGMINE METHYLSULFATE 1 MG/ML
INJECTION, SOLUTION INTRAVENOUS PRN
Status: DISCONTINUED | OUTPATIENT
Start: 2022-08-17 | End: 2022-08-17 | Stop reason: SDUPTHER

## 2022-08-17 RX ORDER — ROCURONIUM BROMIDE 10 MG/ML
INJECTION, SOLUTION INTRAVENOUS PRN
Status: DISCONTINUED | OUTPATIENT
Start: 2022-08-17 | End: 2022-08-17 | Stop reason: SDUPTHER

## 2022-08-17 RX ORDER — BUPIVACAINE HYDROCHLORIDE AND EPINEPHRINE 2.5; 5 MG/ML; UG/ML
INJECTION, SOLUTION EPIDURAL; INFILTRATION; INTRACAUDAL; PERINEURAL PRN
Status: DISCONTINUED | OUTPATIENT
Start: 2022-08-17 | End: 2022-08-17 | Stop reason: ALTCHOICE

## 2022-08-17 RX ORDER — DEXAMETHASONE SODIUM PHOSPHATE 4 MG/ML
INJECTION, SOLUTION INTRA-ARTICULAR; INTRALESIONAL; INTRAMUSCULAR; INTRAVENOUS; SOFT TISSUE PRN
Status: DISCONTINUED | OUTPATIENT
Start: 2022-08-17 | End: 2022-08-17 | Stop reason: SDUPTHER

## 2022-08-17 RX ORDER — OXYCODONE HYDROCHLORIDE 5 MG/1
5 TABLET ORAL EVERY 6 HOURS PRN
Qty: 12 TABLET | Refills: 0 | Status: SHIPPED | OUTPATIENT
Start: 2022-08-17 | End: 2022-08-20

## 2022-08-17 RX ORDER — SODIUM CHLORIDE, SODIUM LACTATE, POTASSIUM CHLORIDE, CALCIUM CHLORIDE 600; 310; 30; 20 MG/100ML; MG/100ML; MG/100ML; MG/100ML
INJECTION, SOLUTION INTRAVENOUS CONTINUOUS PRN
Status: DISCONTINUED | OUTPATIENT
Start: 2022-08-17 | End: 2022-08-17 | Stop reason: SDUPTHER

## 2022-08-17 RX ORDER — GLYCOPYRROLATE 0.2 MG/ML
INJECTION INTRAMUSCULAR; INTRAVENOUS PRN
Status: DISCONTINUED | OUTPATIENT
Start: 2022-08-17 | End: 2022-08-17 | Stop reason: SDUPTHER

## 2022-08-17 RX ADMIN — ROCURONIUM BROMIDE 50 MG: 10 INJECTION, SOLUTION INTRAVENOUS at 08:09

## 2022-08-17 RX ADMIN — FENTANYL CITRATE 100 MCG: 50 INJECTION, SOLUTION INTRAMUSCULAR; INTRAVENOUS at 08:17

## 2022-08-17 RX ADMIN — SODIUM CHLORIDE, POTASSIUM CHLORIDE, SODIUM LACTATE AND CALCIUM CHLORIDE: 600; 310; 30; 20 INJECTION, SOLUTION INTRAVENOUS at 08:57

## 2022-08-17 RX ADMIN — PROPOFOL 200 MG: 10 INJECTION, EMULSION INTRAVENOUS at 08:08

## 2022-08-17 RX ADMIN — KETAMINE HYDROCHLORIDE 20 MG: 10 INJECTION INTRAMUSCULAR; INTRAVENOUS at 08:08

## 2022-08-17 RX ADMIN — SODIUM CHLORIDE: 9 INJECTION, SOLUTION INTRAVENOUS at 08:17

## 2022-08-17 RX ADMIN — ROCURONIUM BROMIDE 10 MG: 10 INJECTION, SOLUTION INTRAVENOUS at 09:00

## 2022-08-17 RX ADMIN — DEXAMETHASONE SODIUM PHOSPHATE 8 MG: 4 INJECTION, SOLUTION INTRAMUSCULAR; INTRAVENOUS at 08:15

## 2022-08-17 RX ADMIN — KETOROLAC TROMETHAMINE 30 MG: 30 INJECTION, SOLUTION INTRAMUSCULAR at 10:06

## 2022-08-17 RX ADMIN — ONDANSETRON 4 MG: 2 INJECTION INTRAMUSCULAR; INTRAVENOUS at 09:34

## 2022-08-17 RX ADMIN — FENTANYL CITRATE 100 MCG: 50 INJECTION, SOLUTION INTRAMUSCULAR; INTRAVENOUS at 08:06

## 2022-08-17 RX ADMIN — CEFAZOLIN 2000 MG: 2 INJECTION, POWDER, FOR SOLUTION INTRAMUSCULAR; INTRAVENOUS at 08:04

## 2022-08-17 RX ADMIN — Medication 3 MG: at 09:51

## 2022-08-17 RX ADMIN — MIDAZOLAM 2 MG: 1 INJECTION INTRAMUSCULAR; INTRAVENOUS at 07:59

## 2022-08-17 RX ADMIN — GLYCOPYRROLATE 0.6 MG: 0.2 INJECTION INTRAMUSCULAR; INTRAVENOUS at 09:51

## 2022-08-17 RX ADMIN — LABETALOL HYDROCHLORIDE 2.5 MG: 5 INJECTION INTRAVENOUS at 09:13

## 2022-08-17 ASSESSMENT — PAIN - FUNCTIONAL ASSESSMENT: PAIN_FUNCTIONAL_ASSESSMENT: 0-10

## 2022-08-17 ASSESSMENT — PAIN SCALES - GENERAL
PAINLEVEL_OUTOF10: 2
PAINLEVEL_OUTOF10: 0

## 2022-08-17 ASSESSMENT — LIFESTYLE VARIABLES: SMOKING_STATUS: 0

## 2022-08-17 NOTE — OP NOTE
Operative Note      Patient: Roselyn Silverio  YOB: 1970  MRN: [id number]    Date of Procedure: 8/17/2022    Pre-Op Diagnosis: Bilateral inguinal hernia without obstruction or gangrene, recurrence not specified [K40.20]    Post-Op Diagnosis: Left-sided large indirect inguinal hernia with fat-containing femoral hernia, large right-sided direct fat-containing inguinal hernia. Procedure(s):  LAPAROSCOPIC ROBOTIC XI ASSISTED BILATERAL INGUINAL HERNIA REPAIR WITH MESH    Surgeon(s):  Laveta Barthel, MD    Assistant:   Resident: Zulema Fernandez MD; Makayla Mojica MD    Anesthesia: General    Estimated Blood Loss (mL): Minimal    Complications: None    Specimens:   * No specimens in log *    Implants:  Implant Name Type Inv. Item Serial No.  Lot No. LRB No. Used Action   MESH ZULAY [id number] TEXTILE MFIL POLYETH TEREPHTHALATE - [id number]  [address] [place], [place] [id number] Right 1 Implanted   MESH ZULAY LAP SELF FIXATING LT NATALIIA POLYLACTIC ACID PROGRIP - [id number]  MESH ZULAY LAP SELF FIXATING LT NATALIIA POLYLACTIC ACID PROGRIP  [employer] US SURGICAL-WD [id number] Left 1 Implanted         Drains:   [REMOVED] Urinary Catheter Wall (Removed)       Findings: Uncomplicated robotic bilateral inguinal hernia repair as stated above    Indications: Patient is a 47 y.o. male who was diagnosed with Bilateral Inguinal Hernia. Risks/Benefits/Alternatives were discussed with the patient, including bleeding, infection, iatrogenic injury to surrounding structures, nerve/vascular injury, recurrence, need for further operations. The patient agreed to undergo the proceed and informed consent was obtained. Procedure: After informed consent, the patient was brought to the operating room and placed Supine. General anesthesia was then induced which the patient tolerated well. Time out was performed to identify the correct patient and procedure. They received appropriate perioperative antibiotics. The abdomen and genitalia were prepped and draped in the usual sterile fashion. Pneumoperitoneum was created with Veress in the left upper quadrant at the site of one of the proposed robotic trocars and 8mm robotic optiview port used in this location for abdominal entry. In the midline between umbilicus and epigastrium, incision was made and 8 mm port was placed into the abdomen. Additional 8 millimeter port was placed under direct visualization in the RUQ. The robot was then brought up to the table and docked. Camera and working instruments are then inserted and directed down to the Left lower quadrant. The defect was  lateral  to the epigastric vessels. The hernia defect was noted to be extremely large without incarceration. The peritoneum was then incised at the level ASIS from lateral to medial. Our preperitoneal working space was then created with blunt dissection as well as scissor cautery. This was carried down to the level of the pubic tubercle medially and to the ASIS laterally. Saul's ligament was identified and cleared off medially. The hernia sac and cord structures were identified. Hernia sac was dissected free from the cord structures. Next identified a fat containing femoral hernia that was reduced in similar fashion using blunt dissection. Once the hernia sac was circumferentially dissected off the cord structures without any tenting, we were ready to place our mesh. Hemostasis was ensured with electrocautery. The Progrip mesh was then inserted and flattened along the myopectineal orifice. We had good coverage of the entire area with peritoneum dissected away from the inferior edge of the mesh. 2-0 V lock suture was then used to sew the peritoneum back together in place. We then turned our attention to the right inguinal hernia. The hernia defect was noted to be medial to the epigastric vessels.   The peritoneum was then incised at the level of the ASIS from lateral to medial using scissor cautery. A preperitoneal working space was created with a combination of blunt dissection as well as scissor cautery. This was carried down to the level of the pubic tubercle medially and the ASIS laterally. Saul's ligament was identified and cleared off medially we are able to see our dissection from the left-sided dissection. The hernia sac and cord structures were identified. The hernia sac was dissected free from the defect and cord structures and reduced into the abdomen. Once the hernia sac was circumferentially dissected off the cord structures without any tenting, we are ready to place our mesh. Hemostasis was ensured with electrocautery. The ProGrip mesh was then inserted and flattened along the myopectineal orifice. We had good coverage of the entire area with peritoneum dissected away from the inferior edge of her mesh. 2-0 V lock suture was then used to sew the peritoneum back in place. During this dissection we had a rent in her peritoneum this was closed using 3-0 Vicryl suture. Pneumoperitoneum was released. Ports were removed and the skin incisions were closed with 4-0 Monocryl suture. Skin glue was applied. Patient tolerated procedure well without any complications. Both testicles were noted to be within the scrotum and a jockstrap was placed at the end of the case. Needle, sponge, instrument count were correct x2. Dr. Shirley Sutherland was present and scrubbed for the procedure. Electronically signed by Akilah Hernandez MD on 8/17/2022 at 10:09 AM    Attestation:    I was present during the procedure and participated in all key aspects.     Susan Erwin MD  08/17/22  7:53 PM

## 2022-08-17 NOTE — ANESTHESIA PRE PROCEDURE
Department of Anesthesiology  Preprocedure Note       Name:  Mina Vega   Age:  47 y.o.  :  1970                                          MRN:  [id number]         Date:  2022      Surgeon: Ashok Gaspar):  Maria Antonia Fraire MD    Procedure: Procedure(s):  LAPAROSCOPIC ROBOTIC XI ASSISTED BILATERAL INGUINAL HERNIA REPAIR WITH MESH    Medications prior to admission:   Prior to Admission medications    Not on File       Current medications:    Current Facility-Administered Medications   Medication Dose Route Frequency Provider Last Rate Last Admin    ceFAZolin (ANCEF) 2,000 mg in sterile water 20 mL IV syringe  2,000 mg IntraVENous On Call to [address] [place] [address], MD           Allergies: Allergies   Allergen Reactions    Other Swelling     bees       Problem List:  There is no problem list on file for this patient. Past Medical History:        Diagnosis Date    Bilateral inguinal hernia     For OR 22       Past Surgical History:        Procedure Laterality Date    VASECTOMY         Social History:    Social History     Tobacco Use    Smoking status: Never    Smokeless tobacco: Never   Substance Use Topics    Alcohol use: Yes     Comment: occasional                                Counseling given: Not Answered      Vital Signs (Current):   Vitals:    08/15/22 1431   Weight: 220 lb (99.8 kg)   Height: 6' 3\" (1.905 m)                                              BP Readings from Last 3 Encounters:   22 113/83   22 124/82   22 128/74       NPO Status:                                                                                 BMI:   Wt Readings from Last 3 Encounters:   08/15/22 220 lb (99.8 kg)   22 200 lb (90.7 kg)   22 228 lb (103.4 kg)     Body mass index is 27.5 kg/m².     CBC:   Lab Results   Component Value Date/Time    WBC 10.4 2019 05:35 PM    RBC 5.27 2019 05:35 PM    HGB 16.5 2019 05:35 PM    HCT 48.5 2019 05:35 PM    MCV 92.0 12/25/2019 05:35 PM    RDW 12.9 12/25/2019 05:35 PM     12/25/2019 05:35 PM       CMP:   Lab Results   Component Value Date/Time     06/27/2022 07:12 AM    K 4.3 06/27/2022 07:12 AM     06/27/2022 07:12 AM    CO2 23 06/27/2022 07:12 AM    BUN 19 06/27/2022 07:12 AM    CREATININE 1.1 06/27/2022 07:12 AM    GFRAA >60 06/27/2022 07:12 AM    LABGLOM >60 06/27/2022 07:12 AM    GLUCOSE 114 07/28/2022 10:36 PM    PROT 7.2 06/27/2022 07:12 AM    CALCIUM 9.5 06/27/2022 07:12 AM    BILITOT 0.7 06/27/2022 07:12 AM    ALKPHOS 83 06/27/2022 07:12 AM    AST 19 06/27/2022 07:12 AM    ALT 18 06/27/2022 07:12 AM       POC Tests: No results for input(s): POCGLU, POCNA, POCK, POCCL, POCBUN, POCHEMO, POCHCT in the last 72 hours. Coags: No results found for: PROTIME, INR, APTT    HCG (If Applicable): No results found for: PREGTESTUR, PREGSERUM, HCG, HCGQUANT     ABGs: No results found for: PHART, PO2ART, CKX3IXB, NPV5KZS, BEART, A1QMQWYA     Type & Screen (If Applicable):  No results found for: LABABO, LABRH    Drug/Infectious Status (If Applicable):  No results found for: HIV, HEPCAB    COVID-19 Screening (If Applicable): No results found for: COVID19        Anesthesia Evaluation  Patient summary reviewed and Nursing notes reviewed no history of anesthetic complications:   Airway: Mallampati: II  TM distance: >3 FB   Neck ROM: full  Mouth opening: > = 3 FB   Dental:    (+) implants      Pulmonary:Negative Pulmonary ROS breath sounds clear to auscultation      (-) not a current smoker                           Cardiovascular:Negative CV ROS  Exercise tolerance: good (>4 METS),           Rhythm: regular  Rate: normal           Beta Blocker:  Not on Beta Blocker         Neuro/Psych:   Negative Neuro/Psych ROS              GI/Hepatic/Renal:            ROS comment: bph. Endo/Other: Negative Endo/Other ROS                    Abdominal:             Vascular: negative vascular ROS.          Other Findings: Anesthesia Plan      general     ASA 2       Induction: intravenous. Anesthetic plan and risks discussed with patient.                         Juvenal Panda MD   8/17/2022       note reviewed and agree with plan MARTHA Lemon - CRNA

## 2022-08-17 NOTE — ANESTHESIA POSTPROCEDURE EVALUATION
Department of Anesthesiology  Postprocedure Note    Patient: Klaudia Jacobs  MRN: [id number]  YOB: 1970  Date of evaluation: 8/17/2022      Procedure Summary     Date: 08/17/22 Room / Location: [facility] OR 10 / [facility]    Anesthesia Start: 0802 Anesthesia Stop: 8995    Procedure: Rebekah (Bilateral: Abdomen) Diagnosis:       Bilateral inguinal hernia without obstruction or gangrene, recurrence not specified      (Bilateral inguinal hernia without obstruction or gangrene, recurrence not specified [K40.20])    Surgeons: Michael Velasquez MD Responsible Provider: Chiqui De La Cruz MD    Anesthesia Type: general ASA Status: 2          Anesthesia Type: No value filed.     Sanjiv Phase I: Sanjiv Score: 10    Sanjiv Phase II:        Anesthesia Post Evaluation    Patient location during evaluation: PACU  Patient participation: complete - patient participated  Level of consciousness: awake and alert  Pain score: 0  Airway patency: patent  Nausea & Vomiting: no nausea and no vomiting  Complications: no  Cardiovascular status: blood pressure returned to baseline  Respiratory status: acceptable  Hydration status: euvolemic  Multimodal analgesia pain management approach

## 2022-08-17 NOTE — DISCHARGE INSTRUCTIONS
SURGERY DISCHARGE INSTRUCTIONS    You may be drowsy or lightheaded after receiving sedation or anesthesia. A responsible person should be with you for the next 24 hours. FOLLOW UP: Call office to schedule follow-up appointment in 2 weeks. DIET: Advance your diet as tolerated. Start with light diet and progress to your normal diet as you feel like eating. If you experience nausea or repeated episodes of vomiting which persist beyond 12-24 hours, notify your doctor. ACTIVITY: Rest today. Increase activity gradually. No driving for 1 day. No driving while on prescription pain medication. No heavy lifting for 4 weeks - nothing over 10 lbs, or heavier than a milk jug. Wear your jockstrap when out of bed. SHOWER/BATHING: Okay to shower in 24 hours after procedure. No tub bathing, swimming or soaking for 2 weeks. WOUND CARE: You have Dermabond dressing (skin glue) applied to your incisions. You do not need to apply anything over them. The glue will gradually work itself off over the next few weeks. Avoid directly applying lotions, creams or oils to your incision. Keep incisions clean and dry. Always ensure you and your care giver clean hands before and after caring for the wound. You may place ice on incisions to decrease the pain and bruising. MEDICATIONS: Take as prescribed. You may take over the counter ibuprofen or tylenol for pain as directed, limit total amount of acetaminophen (tylenol) to 3 grams per 24-hour period. Okay to resume anticoagulant medication after 24hrs. You may experience constipation while taking pain medication, you may take over the counter stool softeners (Docusate/Colace or Senokot-S) as directed.        SPECIAL INSTRUCTIONS:   Call physician if they or any other problems occur:  Call the office if you have a fever over >101F, or if your incision becomes red, tender, or drains more than a small amount of clear fluid  Fever over 101°    Redness, swelling, hardness or warmth at the operative site  Unrelieved nausea    Foul smelling or cloudy drainage at the operative site   Unrelieved pain    Blood soaked dressing (Some oozing may be normal)

## 2022-08-17 NOTE — H&P
[facility] Surgery Clinic Note     Assessment/Plan:        Diagnosis Orders   1. Non-recurrent bilateral inguinal hernia without obstruction or gangrene       We will plan for repair       2. Encounter for screening colonoscopy       Plan for colonoscopy. Left inguinal hernia likely colon containing. We will fix hernias prior to colonoscopy to decrease risk of incomplete colonoscopy                Return for Surgery, Colonoscopy. Chief Complaint   Patient presents with    New Patient       Colonoscopy consult          PCP: Madhu Higgins DO     HPI: Paris Umanzor is a 47 y.o. male who presents in consultation for evaluation of left inguinal hernia. He thinks he has had it for about 6 to 8 months. He felt some discomfort there. Last couple months he is felt a bulge. He thinks the other side is starting with similar symptoms but denies any bulging. He says the left side will reduce if he lays down he can put pressure on it. Activity makes his discomfort worse. He is also due for colonoscopy. He has not had one previously. He denies any issues moving his bowels. He has no diarrhea or constipation. There is no melena or hematochezia. He has no abdominal pain or unintentional weight loss aside from the discomfort from his hernia. There is no family history of colon cancer or inflammatory bowel disease.         Past Medical History        Past Medical History:   Diagnosis Date    Migraines              Past Surgical History         Past Surgical History:   Procedure Laterality Date    VASECTOMY                Home Medications   Prior to Admission medications   Not on File                  Allergies   Allergen Reactions    Other Swelling       bees         Social History   Social History            Socioeconomic History    Marital status:        Spouse name: None    Number of children: None    Years of education: None    Highest education level: None   Tobacco Use    Smoking status: Never    Smokeless tobacco: Never   Vaping Use    Vaping Use: Never used   Substance and Sexual Activity    Alcohol use: Yes       Comment: occasional    Drug use: No    Sexual activity: Yes       Partners: Male            Family History   No family history on file. Review of Systems   All other systems reviewed and are negative. Objective:  Vitals       Vitals:     07/26/22 1243   BP: 124/82   Pulse: 84   Resp: 18   Temp: 97.3 °F (36.3 °C)   TempSrc: Temporal   SpO2: 99%   Weight: 228 lb (103.4 kg)   Height: 6' 3\" (1.905 m)            Physical Exam  Constitutional:       General: He is not in acute distress. Appearance: He is not diaphoretic. HENT:     Head: Normocephalic and atraumatic. Eyes:     General:         Right eye: No discharge. Left eye: No discharge. Neck:     Trachea: No tracheal deviation. Cardiovascular:     Rate and Rhythm: Normal rate. Pulmonary:     Effort: Pulmonary effort is normal. No respiratory distress. Abdominal:     General: There is no distension. Palpations: Abdomen is soft. Tenderness: There is no abdominal tenderness. There is no guarding or rebound. Comments: Bilateral inguinal hernia, left greater than right. Likely colon containing on the left   Skin:     General: Skin is warm and dry. Neurological:     Mental Status: He is alert and oriented to person, place, and time. Estevan Mccurdy MD        NOTE: This report, in part or full,may have been transcribed using voice recognition software. Every effort was made to ensure accuracy; however, inadvertent computerized transcription errors may be present. Please excuse any transcriptional grammatical or spelling errors that may have escaped my editorial review.      CC: Anil Toribio,

## 2022-08-30 ENCOUNTER — OFFICE VISIT (OUTPATIENT)
Dept: SURGERY | Age: 52
End: 2022-08-30

## 2022-08-30 VITALS
WEIGHT: 224 LBS | DIASTOLIC BLOOD PRESSURE: 98 MMHG | HEART RATE: 72 BPM | BODY MASS INDEX: 27.85 KG/M2 | HEIGHT: 75 IN | RESPIRATION RATE: 16 BRPM | OXYGEN SATURATION: 98 % | SYSTOLIC BLOOD PRESSURE: 151 MMHG | TEMPERATURE: 97.2 F

## 2022-08-30 DIAGNOSIS — Z87.19 S/P BILATERAL INGUINAL HERNIA REPAIR: Primary | ICD-10-CM

## 2022-08-30 DIAGNOSIS — Z12.11 ENCOUNTER FOR SCREENING COLONOSCOPY: ICD-10-CM

## 2022-08-30 DIAGNOSIS — Z98.890 S/P BILATERAL INGUINAL HERNIA REPAIR: Primary | ICD-10-CM

## 2022-08-30 PROCEDURE — 99024 POSTOP FOLLOW-UP VISIT: CPT | Performed by: SURGERY

## 2022-08-30 NOTE — PROGRESS NOTES
Went over colon prep with the patient. Handed the instruction letter. The patient verbalized understanding. All questions were answered.    Electronically signed by Jorge Zhou on 8/30/2022 at 1:46 PM

## 2022-08-31 NOTE — PROGRESS NOTES
[facility] Surgery Clinic Note    Assessment/Plan:     Diagnosis Orders   1. S/P bilateral inguinal hernia repair      Healing well. Small right-sided seroma. Continue restrictions. 2. Encounter for screening colonoscopy      He is scheduled for colonoscopy. Return for Colonoscopy. Chief Complaint   Patient presents with    Post-Op Check     2 wk lap chayito demetria inj hernia repair        PCP: Anitha Weaver DO    HPI: Prateek Barnett is a 47 y.o. male here for follow-up of bilateral inguinal hernia repair. He is doing well. His preoperative bulging has resolved. Does complain of a very small lump in the right groin. It is nontender. It does not change in size. His bowels are moving well currently. Review of Systems    The remainder of the past medical, past surgical, family, and psychosocial history, as well as medication and allergy review, were completed and are as documented elsewhere in the chart. Objective:  Vitals:    08/30/22 1320   BP: (!) 151/98   Pulse: 72   Resp: 16   Temp: 97.2 °F (36.2 °C)   SpO2: 98%   Weight: 224 lb (101.6 kg)   Height: 6' 3\" (1.905 m)          Physical Exam  Constitutional:       General: He is not in acute distress. Appearance: He is not diaphoretic. Cardiovascular:      Rate and Rhythm: Normal rate. Pulmonary:      Effort: Pulmonary effort is normal. No respiratory distress. Abdominal:      General: There is no distension. Palpations: Abdomen is soft. Tenderness: There is no abdominal tenderness. There is no guarding or rebound. Comments: Incisions clean dry and intact. no evidence of hernia recurrence, small right-sided seroma about the size of a grape             Arin Sepulveda MD      NOTE: This report, in part or full, may have been transcribed using voice recognition software. Every effort was made to ensure accuracy; however, inadvertent computerized transcription errors may be present.  Please excuse any transcriptional grammatical or spelling errors that may have escaped my editorial review.       CC: Erick Christensen, DO

## 2022-11-22 NOTE — PROGRESS NOTES
[facility] PRE-ADMISSION TESTING INSTRUCTIONS      ARRIVAL INSTRUCTIONS:  [x] Parking the day of Surgery is located in the Main Entrance lot. Upon entering the main door make an immediate right to the surgery reception desk. [x] Bring photo ID and insurance card    [] Bring in a copy of Living will or Durable Power of  papers. [x] Please be sure to arrange for responsible adult to provide transportation to and from the hospital    [x] Please arrange for responsible adult to be with you for the 24 hour period post procedure due to having anesthesia    [x] If you awake am of surgery not feeling well or have temperature >100 please call [phone]    GENERAL INSTRUCTIONS:    [x] Nothing by mouth after midnight, including gum, candy, mints or water    [x] You may brush your teeth, but do not swallow any water    [x] Take medications as instructed with 1-2 oz of water    [x] Stop herbal supplements and vitamins 5 days prior to procedure    [x] Follow preop dosing of blood thinners per physician instructions    [] Take 1/2 dose of evening insulin, but no insulin after midnight    [] No oral diabetic medications after midnight    [] If diabetic and have low blood sugar or feel symptomatic, take 1-2oz apple juice only    [] Bring inhalers day of surgery    [] Bring C-PAP/ Bi-Pap day of surgery    [] Bring urine specimen day of surgery    [x] Shower or bath with soap, lather and rinse well, AM of Surgery, no lotion, powders or creams to surgical site    [x] Follow bowel prep as instructed per surgeon    [x] No tobacco products within 24 hours of surgery     [x] No alcohol or illegal drug use within 24 hours of surgery.     [x] Jewelry, body piercing's, eyeglasses, contact lenses and dentures are not permitted into surgery (bring cases)      [x] Please do not wear any nail polish, make up or hair products on the day of surgery    [x] You can expect a call the business day prior to procedure to notify you if your arrival time changes    [x] If you receive a survey after surgery we would greatly appreciate your comments    [x] Please notify surgeon if you develop any illness between now and time of surgery (cold, cough, sore throat, fever, nausea, vomiting) or any signs of infections  including skin, wounds, and dental.    []  The Outpatient Pharmacy is available to fill your prescription here on your day of surgery, ask your preop nurse for details

## 2022-11-29 ENCOUNTER — HOSPITAL ENCOUNTER (OUTPATIENT)
Age: 52
Setting detail: OUTPATIENT SURGERY
Discharge: HOME | End: 2022-11-29
Attending: SURGERY | Admitting: SURGERY
Payer: COMMERCIAL

## 2022-11-29 ENCOUNTER — ANESTHESIA EVENT (OUTPATIENT)
Dept: ENDOSCOPY | Age: 52
End: 2022-11-29
Payer: COMMERCIAL

## 2022-11-29 ENCOUNTER — ANESTHESIA (OUTPATIENT)
Dept: ENDOSCOPY | Age: 52
End: 2022-11-29
Payer: COMMERCIAL

## 2022-11-29 VITALS
DIASTOLIC BLOOD PRESSURE: 85 MMHG | HEIGHT: 75 IN | BODY MASS INDEX: 27.98 KG/M2 | HEART RATE: 68 BPM | SYSTOLIC BLOOD PRESSURE: 132 MMHG | TEMPERATURE: 97.6 F | OXYGEN SATURATION: 94 % | WEIGHT: 225 LBS | RESPIRATION RATE: 18 BRPM

## 2022-11-29 DIAGNOSIS — Z12.11 SPECIAL SCREENING FOR MALIGNANT NEOPLASMS, COLON: ICD-10-CM

## 2022-11-29 PROCEDURE — 88305 TISSUE EXAM BY PATHOLOGIST: CPT

## 2022-11-29 PROCEDURE — 3609010600 HC COLONOSCOPY POLYPECTOMY SNARE/COLD BIOPSY: Performed by: SURGERY

## 2022-11-29 PROCEDURE — 45385 COLONOSCOPY W/LESION REMOVAL: CPT | Performed by: SURGERY

## 2022-11-29 PROCEDURE — 2709999900 HC NON-CHARGEABLE SUPPLY: Performed by: SURGERY

## 2022-11-29 PROCEDURE — 2580000003 HC RX 258: Performed by: REGISTERED NURSE

## 2022-11-29 PROCEDURE — 7100000010 HC PHASE II RECOVERY - FIRST 15 MIN: Performed by: SURGERY

## 2022-11-29 PROCEDURE — 6360000002 HC RX W HCPCS: Performed by: REGISTERED NURSE

## 2022-11-29 PROCEDURE — 7100000011 HC PHASE II RECOVERY - ADDTL 15 MIN: Performed by: SURGERY

## 2022-11-29 PROCEDURE — 3700000000 HC ANESTHESIA ATTENDED CARE: Performed by: SURGERY

## 2022-11-29 PROCEDURE — 3700000001 HC ADD 15 MINUTES (ANESTHESIA): Performed by: SURGERY

## 2022-11-29 PROCEDURE — 45380 COLONOSCOPY AND BIOPSY: CPT | Performed by: SURGERY

## 2022-11-29 RX ORDER — SODIUM CHLORIDE 9 MG/ML
INJECTION, SOLUTION INTRAVENOUS CONTINUOUS PRN
Status: DISCONTINUED | OUTPATIENT
Start: 2022-11-29 | End: 2022-11-29 | Stop reason: SDUPTHER

## 2022-11-29 RX ORDER — PROPOFOL 10 MG/ML
INJECTION, EMULSION INTRAVENOUS PRN
Status: DISCONTINUED | OUTPATIENT
Start: 2022-11-29 | End: 2022-11-29 | Stop reason: SDUPTHER

## 2022-11-29 RX ADMIN — SODIUM CHLORIDE: 9 INJECTION, SOLUTION INTRAVENOUS at 09:38

## 2022-11-29 RX ADMIN — PROPOFOL 300 MG: 10 INJECTION, EMULSION INTRAVENOUS at 09:39

## 2022-11-29 ASSESSMENT — PAIN SCALES - GENERAL: PAINLEVEL_OUTOF10: 0

## 2022-11-29 ASSESSMENT — LIFESTYLE VARIABLES: SMOKING_STATUS: 0

## 2022-11-29 NOTE — OP NOTE
Colonoscopy Op Note  PATIENT: Manohar Marrero    DATE OF PROCEDURE: 11/29/2022    SURGEON: Lamont Man MD    PREOPERATIVE DIAGNOSIS: Low risk colorectal cancer screening    POSTOPERATIVE DIAGNOSIS: Same, colon polyps, mild diverticulosis, hemorrhoids    OPERATION: Procedure(s):  COLONOSCOPY POLYPECTOMY SNARE with CAUTERY  COLONOSCOPY WITH BIOPSY    ANESTHESIA: Local monitored anesthesia. ESTIMATED BLOOD LOSS: nil     COMPLICATIONS: None. SPECIMENS:   ID Type Source Tests Collected by Time Destination   A : LEFT COLON POLYPECTOMY Tissue Colon SURGICAL PATHOLOGY Lamont Man MD 11/29/2022 1159    B : PROXIMAL TRANSVERSE COLON POLYPECTOMY Tissue Colon SURGICAL PATHOLOGY Lamont Man MD 11/29/2022 0950    C : RECTAL POLYP BIOPSY X 5 Tissue Colon SURGICAL PATHOLOGY Lamont Man MD 11/29/2022 6198        HISTORY: The patient is a 47y.o. year old male with history of above preop diagnosis. I recommended colonoscopy with possible biopsy or polypectomy and I explained the risk, benefits, expected outcome, and alternatives to the procedure. Risks included but are not limited to bleeding, infection, respiratory distress, hypotension, and perforation of the colon. The patient understands and is in agreement. PROCEDURE: The patient was given IV conscious sedation per anesthesia. The patient was given supplemental oxygen by nasal cannula. The colonoscope was inserted per rectum and advanced under direct vision to the cecum without difficulty, identified by appendiceal orifice and ileocecal valve. The prep was good so exam was adequate. FINDINGS:    SUSAN: Hemorrhoids    Terminal Ileum: Not examined    Colon: In left colon there is a 5 mm polyp status post hot snare polypectomy. In the proximal transverse colon there is a 3 mm polyp status post cold snare polypectomy. There is mild diverticulosis.     Rectum/Anus: examined in normal and retroflexed positions -there are several diminutive rectal polyp status post forcep polypectomy. Grade 2 hemorrhoids. The colon was decompressed and the scope was removed. The withdraw time was approximately 10 minutes. The patient tolerated the procedure well. ASSESSMENT/PLAN:   Follow-up pathology  Fiber diet  Colorectal Cancer Screening - recommend repeat colonoscopy in 3 years (may change pending biopsy results). Sooner if issues/concerns.     Tamar Oneil MD  11/29/22  10:05 AM

## 2022-11-29 NOTE — H&P
111 [facility] Surgery Clinic Note     Assessment/Plan:       Diagnosis Orders   1. S/P bilateral inguinal hernia repair        Healing well. Small right-sided seroma. Continue restrictions. 2. Encounter for screening colonoscopy        He is scheduled for colonoscopy. Return for Colonoscopy. Chief Complaint   Patient presents with    Post-Op Check       2 wk lap chayito demetria inj hernia repair          PCP: Ang Morrell DO     HPI: Manohar Marrero is a 47 y.o. male here for follow-up of bilateral inguinal hernia repair. He is doing well. His preoperative bulging has resolved. Does complain of a very small lump in the right groin. It is nontender. It does not change in size. His bowels are moving well currently. Review of Systems     The remainder of the past medical, past surgical, family, and psychosocial history, as well as medication and allergy review, were completed and are as documented elsewhere in the chart. Objective:  Vitals       Vitals:     08/30/22 1320   BP: (!) 151/98   Pulse: 72   Resp: 16   Temp: 97.2 °F (36.2 °C)   SpO2: 98%   Weight: 224 lb (101.6 kg)   Height: 6' 3\" (1.905 m)            Physical Exam  Constitutional:       General: He is not in acute distress. Appearance: He is not diaphoretic. Cardiovascular:      Rate and Rhythm: Normal rate. Pulmonary:      Effort: Pulmonary effort is normal. No respiratory distress. Abdominal:      General: There is no distension. Palpations: Abdomen is soft. Tenderness: There is no abdominal tenderness. There is no guarding or rebound. Comments: Incisions clean dry and intact. no evidence of hernia recurrence, small right-sided seroma about the size of a grape                  Lamont Man MD        NOTE: This report, in part or full, may have been transcribed using voice recognition software.  Every effort was made to ensure accuracy; however, inadvertent computerized transcription errors may be present. Please excuse any transcriptional grammatical or spelling errors that may have escaped my editorial review.         CC: Funmi Vasquez, DO

## 2022-11-29 NOTE — ANESTHESIA PRE PROCEDURE
Department of Anesthesiology  Preprocedure Note       Name:  Koko Martinez   Age:  47 y.o.  :  1970                                          MRN:  [id number]         Date:  2022      Surgeon: Christy Pérez):  Inocente Mathew MD    Procedure: Procedure(s):  COLORECTAL CANCER SCREENING, NOT HIGH RISK    Medications prior to admission:   Prior to Admission medications    Not on File       Current medications:    No current facility-administered medications for this encounter. Allergies: Allergies   Allergen Reactions    Other Swelling     bees       Problem List:  There is no problem list on file for this patient. Past Medical History:    History reviewed. No pertinent past medical history. Past Surgical History:        Procedure Laterality Date    HERNIA REPAIR Bilateral 2022    LAPAROSCOPIC ROBOTIC XI ASSISTED BILATERAL INGUINAL HERNIA REPAIR WITH MESH performed by Inocente Mathew MD at [address] [place] [address]         Social History:    Social History     Tobacco Use    Smoking status: Never    Smokeless tobacco: Never   Substance Use Topics    Alcohol use: Yes     Comment: occasional                                Counseling given: Not Answered      Vital Signs (Current):   Vitals:    22 1604   Weight: 225 lb (102.1 kg)   Height: 6' 3\" (1.905 m)                                              BP Readings from Last 3 Encounters:   22 (!) 151/98   22 (!) 130/90   22 113/83       NPO Status:                                                                                 BMI:   Wt Readings from Last 3 Encounters:   22 225 lb (102.1 kg)   22 224 lb (101.6 kg)   22 220 lb (99.8 kg)     Body mass index is 28.12 kg/m².     CBC:   Lab Results   Component Value Date/Time    WBC 10.4 2019 05:35 PM    RBC 5.27 2019 05:35 PM    HGB 16.5 2019 05:35 PM    HCT 48.5 2019 05:35 PM    MCV 92.0 2019 05:35 PM    RDW 12.9 2019 05:35 PM     12/25/2019 05:35 PM       CMP:   Lab Results   Component Value Date/Time     06/27/2022 07:12 AM    K 4.3 06/27/2022 07:12 AM     06/27/2022 07:12 AM    CO2 23 06/27/2022 07:12 AM    BUN 19 06/27/2022 07:12 AM    CREATININE 1.1 06/27/2022 07:12 AM    GFRAA >60 06/27/2022 07:12 AM    LABGLOM >60 06/27/2022 07:12 AM    GLUCOSE 114 07/28/2022 10:36 PM    PROT 7.2 06/27/2022 07:12 AM    CALCIUM 9.5 06/27/2022 07:12 AM    BILITOT 0.7 06/27/2022 07:12 AM    ALKPHOS 83 06/27/2022 07:12 AM    AST 19 06/27/2022 07:12 AM    ALT 18 06/27/2022 07:12 AM       POC Tests: No results for input(s): POCGLU, POCNA, POCK, POCCL, POCBUN, POCHEMO, POCHCT in the last 72 hours. Coags: No results found for: PROTIME, INR, APTT    HCG (If Applicable): No results found for: PREGTESTUR, PREGSERUM, HCG, HCGQUANT     ABGs: No results found for: PHART, PO2ART, IBT3XZL, IWZ4SMB, BEART, Z2YYTIOX     Type & Screen (If Applicable):  No results found for: LABABO, LABRH    Drug/Infectious Status (If Applicable):  No results found for: HIV, HEPCAB    COVID-19 Screening (If Applicable): No results found for: COVID19        Anesthesia Evaluation  Patient summary reviewed and Nursing notes reviewed no history of anesthetic complications:   Airway: Mallampati: II  TM distance: >3 FB   Neck ROM: full  Mouth opening: > = 3 FB   Dental:    (+) implants      Pulmonary:Negative Pulmonary ROS breath sounds clear to auscultation      (-) not a current smoker                           Cardiovascular:Negative CV ROS  Exercise tolerance: good (>4 METS),           Rhythm: regular  Rate: normal           Beta Blocker:  Not on Beta Blocker         Neuro/Psych:   Negative Neuro/Psych ROS              GI/Hepatic/Renal:            ROS comment: bph. Endo/Other: Negative Endo/Other ROS                    Abdominal:             Vascular: negative vascular ROS.          Other Findings:             Anesthesia Plan      MAC     ASA 2       Induction: intravenous. Anesthetic plan and risks discussed with patient. Plan discussed with CRNA.                     Keily Doll MD   11/29/2022       note reviewed and agree with plan Keily Doll MD

## 2022-11-29 NOTE — ANESTHESIA POSTPROCEDURE EVALUATION
Department of Anesthesiology  Postprocedure Note    Patient: Megan Lucia  MRN: [id number]  YOB: 1970  Date of evaluation: 11/29/2022      Procedure Summary     Date: 11/29/22 Room / Location: [facility] / [facility]    Anesthesia Start: 7160 Anesthesia Stop: 8362    Procedures:       COLONOSCOPY POLYPECTOMY SNARE with CAUTERY      COLONOSCOPY WITH BIOPSY Diagnosis:       Special screening for malignant neoplasms, colon      (Special screening for malignant neoplasms, colon [Z12.11])    Surgeons: Estevan Mccurdy MD Responsible Provider: Mickie Phalen, MD    Anesthesia Type: MAC ASA Status: 2          Anesthesia Type: No value filed.     Sanjiv Phase I: Sanjiv Score: 10    Sanjiv Phase II: Sanjiv Score: 10      Anesthesia Post Evaluation    Patient location during evaluation: PACU  Patient participation: complete - patient participated  Level of consciousness: awake  Pain score: 3  Airway patency: patent  Nausea & Vomiting: no nausea and no vomiting  Complications: no  Cardiovascular status: blood pressure returned to baseline  Respiratory status: acceptable  Hydration status: euvolemic

## 2022-12-13 ENCOUNTER — OFFICE VISIT (OUTPATIENT)
Dept: SURGERY | Age: 52
End: 2022-12-13
Payer: COMMERCIAL

## 2022-12-13 VITALS
TEMPERATURE: 97.3 F | HEART RATE: 88 BPM | WEIGHT: 230 LBS | RESPIRATION RATE: 18 BRPM | SYSTOLIC BLOOD PRESSURE: 152 MMHG | OXYGEN SATURATION: 98 % | HEIGHT: 75 IN | DIASTOLIC BLOOD PRESSURE: 99 MMHG | BODY MASS INDEX: 28.6 KG/M2

## 2022-12-13 DIAGNOSIS — K63.5 COLORECTAL POLYP DETECTED ON COLONOSCOPY: Primary | ICD-10-CM

## 2022-12-13 DIAGNOSIS — K57.30 DIVERTICULOSIS OF LARGE INTESTINE WITHOUT HEMORRHAGE: ICD-10-CM

## 2022-12-13 PROCEDURE — 99213 OFFICE O/P EST LOW 20 MIN: CPT | Performed by: SURGERY

## 2022-12-28 NOTE — PROGRESS NOTES
111 [facility] Surgery Clinic Note    Assessment/Plan:     Diagnosis Orders   1. Colorectal polyps detected on colonoscopy      Repeat colonoscopy in 5 years      2. Diverticulosis of large intestine without hemorrhage      Fiber diet          Return if symptoms worsen or fail to improve. Chief Complaint   Patient presents with    Results     Colonoscopy results       PCP: Asael Mayes DO    HPI: Caryl Berg is a 47 y.o. male here for follow-up of colonoscopy. He is doing well. Denies any issues. His bowels are moving well. He has no abdominal pain. He denies any bleeding. He was found to have diverticulosis. He also had polyps removed. On pathology these showed to be tubular adenomas. Review of Systems   All other systems reviewed and are negative. History reviewed. No pertinent past medical history. Past Surgical History:   Procedure Laterality Date    COLONOSCOPY N/A 11/29/2022    COLONOSCOPY POLYPECTOMY SNARE with CAUTERY performed by Severiano Acosta MD at [address] [place] [address] 8/17/2022    LAPAROSCOPIC ROBOTIC XI ASSISTED BILATERAL INGUINAL HERNIA REPAIR WITH MESH performed by Severiano Ac[facility] MD at [facility] [place]         History reviewed. No pertinent family history.     Social History     Socioeconomic History    Marital status:      Spouse name: Not on file    Number of children: Not on file    Years of education: Not on file    Highest education level: Not on file   Occupational History    Not on file   Tobacco Use    Smoking status: Never    Smokeless tobacco: Never   Vaping Use    Vaping Use: Never used   Substance and Sexual Activity    Alcohol use: Yes     Comment: occasional    Drug use: No    Sexual activity: Not on file   Other Topics Concern    Not on file   Social History Narrative    Not on file     Social Determinants of Health     Financial Resource Strain: Not on file   Food Insecurity: Not on file   Transportation Needs: Not on file Physical Activity: Not on file   Stress: Not on file   Social Connections: Not on file   Intimate Partner Violence: Not on file   Housing Stability: Not on file       Allergies   Allergen Reactions    Other Swelling     bees         No current outpatient medications on file. No current facility-administered medications for this visit. The remainder of the past medical, past surgical, family, and psychosocial history, as well as medication and allergy review, were completed and are as documented elsewhere in the chart. Objective:  Vitals:    12/13/22 1438   BP: (!) 152/99   Pulse: 88   Resp: 18   Temp: 97.3 °F (36.3 °C)   TempSrc: Temporal   SpO2: 98%   Weight: 230 lb (104.3 kg)   Height: 6' 3\" (1.905 m)          Physical Exam  Constitutional:       General: He is not in acute distress. Appearance: He is not diaphoretic. Cardiovascular:      Rate and Rhythm: Normal rate. Pulmonary:      Effort: Pulmonary effort is normal. No respiratory distress. Abdominal:      General: There is no distension. Palpations: Abdomen is soft. Tenderness: There is no abdominal tenderness. There is no guarding or rebound. Clarissa Ospina MD      I have examined the patient and performed the key aspects of physical exam, reviewed the record (including all pertinent and new radiology images and laboratory findings, referring provider notes and results), and discussed the case with the primary and referring provider where applicable. NOTE: This report, in part or full, may have been transcribed using voice recognition software. Every effort was made to ensure accuracy; however, inadvertent computerized transcription errors may be present. Please excuse any transcriptional grammatical or spelling errors that may have escaped my editorial review.       CC: Denia Snyder DO

## 2023-01-12 ENCOUNTER — HOSPITAL ENCOUNTER (OUTPATIENT)
Age: 53
Discharge: HOME | End: 2023-01-14

## 2023-07-03 ENCOUNTER — OFFICE VISIT (OUTPATIENT)
Dept: PRIMARY CARE CLINIC | Age: 53
End: 2023-07-03
Payer: COMMERCIAL

## 2023-07-03 VITALS
HEART RATE: 75 BPM | TEMPERATURE: 98.6 F | DIASTOLIC BLOOD PRESSURE: 74 MMHG | WEIGHT: 227 LBS | SYSTOLIC BLOOD PRESSURE: 122 MMHG | OXYGEN SATURATION: 96 % | HEIGHT: 75 IN | BODY MASS INDEX: 28.23 KG/M2

## 2023-07-03 DIAGNOSIS — Z00.01 ENCOUNTER FOR GENERAL ADULT MEDICAL EXAMINATION WITH ABNORMAL FINDINGS: ICD-10-CM

## 2023-07-03 DIAGNOSIS — Z00.01 ENCOUNTER FOR GENERAL ADULT MEDICAL EXAMINATION WITH ABNORMAL FINDINGS: Primary | ICD-10-CM

## 2023-07-03 DIAGNOSIS — R97.20 ELEVATED PSA: ICD-10-CM

## 2023-07-03 LAB
ALBUMIN SERPL-MCNC: 4.8 G/DL (ref 3.5–5.2)
ALP SERPL-CCNC: 87 U/L (ref 40–129)
ALT SERPL-CCNC: 23 U/L (ref 0–40)
ANION GAP SERPL CALCULATED.3IONS-SCNC: 15 MMOL/L (ref 7–16)
AST SERPL-CCNC: 23 U/L (ref 0–39)
BILIRUB SERPL-MCNC: 1.1 MG/DL (ref 0–1.2)
BUN SERPL-MCNC: 17 MG/DL (ref 6–20)
CALCIUM SERPL-MCNC: 9.8 MG/DL (ref 8.6–10.2)
CHLORIDE SERPL-SCNC: 105 MMOL/L (ref 98–107)
CHOLESTEROL, TOTAL: 148 MG/DL (ref 0–199)
CO2 SERPL-SCNC: 20 MMOL/L (ref 22–29)
CREAT SERPL-MCNC: 1 MG/DL (ref 0.7–1.2)
GLUCOSE SERPL-MCNC: 96 MG/DL (ref 74–99)
HDLC SERPL-MCNC: 47 MG/DL
LDLC SERPL CALC-MCNC: 83 MG/DL (ref 0–99)
POTASSIUM SERPL-SCNC: 4.5 MMOL/L (ref 3.5–5)
PROT SERPL-MCNC: 7.6 G/DL (ref 6.4–8.3)
SODIUM SERPL-SCNC: 140 MMOL/L (ref 132–146)
TRIGL SERPL-MCNC: 91 MG/DL (ref 0–149)
VLDLC SERPL CALC-MCNC: 18 MG/DL

## 2023-07-03 PROCEDURE — 99396 PREV VISIT EST AGE 40-64: CPT | Performed by: FAMILY MEDICINE

## 2023-07-03 ASSESSMENT — ENCOUNTER SYMPTOMS
CONSTIPATION: 0
EYE PAIN: 0
CHANGE IN BOWEL HABIT: 0
EYE ITCHING: 0
VOMITING: 0
WHEEZING: 0
APNEA: 0
SINUS PRESSURE: 0
RHINORRHEA: 0
COLOR CHANGE: 0
DIARRHEA: 0
VISUAL CHANGE: 0
ABDOMINAL PAIN: 0
CHEST TIGHTNESS: 0
EYE REDNESS: 0
COUGH: 0
SORE THROAT: 0
SWOLLEN GLANDS: 0
BACK PAIN: 0
NAUSEA: 0
BLOOD IN STOOL: 0
SHORTNESS OF BREATH: 0

## 2023-07-03 ASSESSMENT — PATIENT HEALTH QUESTIONNAIRE - PHQ9
SUM OF ALL RESPONSES TO PHQ9 QUESTIONS 1 & 2: 0
SUM OF ALL RESPONSES TO PHQ QUESTIONS 1-9: 0
2. FEELING DOWN, DEPRESSED OR HOPELESS: 0
SUM OF ALL RESPONSES TO PHQ QUESTIONS 1-9: 0
1. LITTLE INTEREST OR PLEASURE IN DOING THINGS: 0
SUM OF ALL RESPONSES TO PHQ QUESTIONS 1-9: 0
SUM OF ALL RESPONSES TO PHQ QUESTIONS 1-9: 0

## 2023-07-03 NOTE — PROGRESS NOTES
Chief Complaint:     Chief Complaint   Patient presents with    Annual Exam         Other  Chronicity: elevated PSA. The current episode started more than 1 year ago. The problem occurs daily. The problem has been unchanged. Pertinent negatives include no abdominal pain, arthralgias, change in bowel habit, chest pain, chills, congestion, coughing, diaphoresis, fatigue, fever, headaches, joint swelling, myalgias, nausea, neck pain, numbness, rash, sore throat, swollen glands, urinary symptoms, vertigo, visual change, vomiting or weakness. Nothing aggravates the symptoms. He has tried nothing for the symptoms. The treatment provided no relief. There is no problem list on file for this patient. History reviewed. No pertinent past medical history. Past Surgical History:   Procedure Laterality Date    COLONOSCOPY N/A 11/29/2022    COLONOSCOPY POLYPECTOMY SNARE with CAUTERY performed by Roxanne Haines MD at [address] [place] [address] 8/17/2022    LAPAROSCOPIC ROBOTIC XI ASSISTED BILATERAL INGUINAL HERNIA REPAIR WITH MESH performed by Roxanne Haines MD at [address] [place] [address]         No current outpatient medications on file. No current facility-administered medications for this visit. Allergies   Allergen Reactions    Other Swelling     bees       Social History     Socioeconomic History    Marital status:      Spouse name: None    Number of children: None    Years of education: None    Highest education level: None   Tobacco Use    Smoking status: Never    Smokeless tobacco: Never   Vaping Use    Vaping Use: Never used   Substance and Sexual Activity    Alcohol use: Yes     Comment: occasional    Drug use: No       History reviewed. No pertinent family history. Review of Systems   Constitutional:  Negative for activity change, appetite change, chills, diaphoresis, fatigue and fever.    HENT:  Negative for congestion, ear pain, hearing loss, nosebleeds, rhinorrhea, sinus

## 2024-01-04 ENCOUNTER — OFFICE VISIT (OUTPATIENT)
Dept: FAMILY MEDICINE CLINIC | Age: 54
End: 2024-01-04
Payer: COMMERCIAL

## 2024-01-04 VITALS
DIASTOLIC BLOOD PRESSURE: 78 MMHG | SYSTOLIC BLOOD PRESSURE: 130 MMHG | OXYGEN SATURATION: 98 % | WEIGHT: 227 LBS | HEART RATE: 64 BPM | TEMPERATURE: 97.5 F | BODY MASS INDEX: 28.37 KG/M2

## 2024-01-04 DIAGNOSIS — J02.9 PHARYNGITIS, UNSPECIFIED ETIOLOGY: Primary | ICD-10-CM

## 2024-01-04 PROCEDURE — 99213 OFFICE O/P EST LOW 20 MIN: CPT | Performed by: FAMILY MEDICINE

## 2024-01-04 RX ORDER — AMOXICILLIN 875 MG/1
875 TABLET, COATED ORAL 2 TIMES DAILY
Qty: 14 TABLET | Refills: 0 | Status: SHIPPED | OUTPATIENT
Start: 2024-01-04 | End: 2024-01-11

## 2024-01-04 RX ORDER — PREDNISONE 10 MG/1
TABLET ORAL
Qty: 30 TABLET | Refills: 0 | Status: SHIPPED | OUTPATIENT
Start: 2024-01-04

## 2024-01-04 RX ORDER — LIDOCAINE HYDROCHLORIDE 20 MG/ML
10 SOLUTION OROPHARYNGEAL
Qty: 100 ML | Refills: 0 | Status: SHIPPED | OUTPATIENT
Start: 2024-01-04

## 2024-01-04 ASSESSMENT — ENCOUNTER SYMPTOMS
SINUS PRESSURE: 1
SORE THROAT: 1
TROUBLE SWALLOWING: 0
GASTROINTESTINAL NEGATIVE: 1
EYES NEGATIVE: 1
RESPIRATORY NEGATIVE: 1

## 2024-01-04 NOTE — PROGRESS NOTES
tablet, Take 1 tablet by mouth 2 times daily for 7 days, Disp: 14 tablet, Rfl: 0    predniSONE (DELTASONE) 10 MG tablet, Take 4 tabs x 3 days, 3 tabs x 3 days, 2 tabs x 3 days, 1 tab x 3 days, stop., Disp: 30 tablet, Rfl: 0    lidocaine viscous hcl (XYLOCAINE) 2 % SOLN solution, Take 10 mLs by mouth every 3 hours as needed for Irritation, Disp: 100 mL, Rfl: 0   There is no problem list on file for this patient.    No past medical history on file.  Past Surgical History:   Procedure Laterality Date    COLONOSCOPY N/A 11/29/2022    COLONOSCOPY POLYPECTOMY SNARE with CAUTERY performed by Norris Robertson MD at [facility] ENDOSCOPY    HERNIA REPAIR Bilateral 8/17/2022    LAPAROSCOPIC ROBOTIC XI ASSISTED BILATERAL INGUINAL HERNIA REPAIR WITH MESH performed by Norris Robertson MD at [facility] OR    VASECTOMY       Social History     Socioeconomic History    Marital status:      Spouse name: Not on file    Number of children: Not on file    Years of education: Not on file    Highest education level: Not on file   Occupational History    Not on file   Tobacco Use    Smoking status: Never    Smokeless tobacco: Never   Vaping Use    Vaping Use: Never used   Substance and Sexual Activity    Alcohol use: Yes     Comment: occasional    Drug use: No    Sexual activity: Not on file   Other Topics Concern    Not on file   Social History Narrative    Not on file     Social Determinants of Health     Financial Resource Strain: Not on file   Food Insecurity: Not on file   Transportation Needs: Not on file   Physical Activity: Not on file   Stress: Not on file   Social Connections: Not on file   Intimate Partner Violence: Not on file   Housing Stability: Not on file     No family history on file.   There are no preventive care reminders to display for this patient.  There are no preventive care reminders to display for this patient.   There are no preventive care reminders to display for this patient.   Health Maintenance Due   Topic

## 2025-04-03 NOTE — PROGRESS NOTES
[facility] PRE-ADMISSION TESTING INSTRUCTIONS    The Preadmission Testing patient is instructed accordingly using the following criteria (check applicable):    ARRIVAL INSTRUCTIONS:   Arrival Time: 0700    [x] Parking the day of Surgery is located in the Main Entrance lot.  Upon entering through the main entrance (Entrance A) make an immediate right to the surgery reception desk    [x] Bring photo ID and insurance card    [] Bring in a copy of Living will or Durable Power of  papers.    [x] Please be sure to arrange for a responsible adult to provide transportation to and from the hospital    [x] Please arrange for a responsible adult to be with you for the 24 hour period post procedure, due to having anesthesia    [x] Please notify surgeon if you develop any illness between now and time of surgery (cold, cough, sore throat, fever, nausea, vomiting) or any signs of infections  including skin, wounds, and dental.    [x] If you awake am of surgery not feeling well or have temperature >100 please call [phone].    GENERAL INSTRUCTIONS:    [x] No solid foods after midnight, may have up to 8oz of water until 4 hours prior to surgery. No gum, no candy, no mints.  NPO time: 0500       [x] You may brush your teeth    [] Take medications as instructed (Read back and verified by patient)   Take none    [] Bring inhalers day of surgery    [x] Stop herbal supplements and vitamins 5 days prior to procedure    [] Follow preop dosing of blood thinners per physician instructions    [] Take 1/2 dose of evening insulin, but no insulin after midnight    [] No oral diabetic medications after midnight    [] If diabetic and have low blood sugar or feel symptomatic, take 1-2oz apple juice only    [] Bring urine specimen day of surgery    [x] Shower or bath with soap, lather and rinse well, AM of Surgery, no lotion, powders or creams to surgical site    [x] Please do not wear any nail polish, make up,  hair products, body spray, aftershave, cologne or perfume on the day of surgery    [x] Jewelry, body piercings, eyeglasses, contact lenses and dentures are not permitted into surgery (bring cases)    [x] Follow bowel prep as instructed per surgeon    [x] No tobacco products within 24 hours of surgery     [x] No alcohol or illegal drug use, marijuana included, within 24 hours of surgery.    [x] You can expect a call the business day prior to procedure to notify you if your arrival time changes    [x] If you receive a survey after surgery we would greatly appreciate your comments    [x] Parent/guardian of a minor must accompany their child and remain on the premises  the entire time they are under our care    [x]  The Outpatient Pharmacy is available to fill your prescription here on your day of surgery, ask your preop nurse for details    All patient questions answered at this time.

## 2025-04-06 ENCOUNTER — PREP FOR PROCEDURE (OUTPATIENT)
Dept: UROLOGY | Age: 55
End: 2025-04-06

## 2025-04-06 RX ORDER — SODIUM CHLORIDE 9 MG/ML
INJECTION, SOLUTION INTRAVENOUS CONTINUOUS
Status: CANCELLED | OUTPATIENT
Start: 2025-04-06

## 2025-04-06 RX ORDER — SODIUM CHLORIDE 9 MG/ML
INJECTION, SOLUTION INTRAVENOUS PRN
Status: CANCELLED | OUTPATIENT
Start: 2025-04-06

## 2025-04-06 RX ORDER — SODIUM CHLORIDE 0.9 % (FLUSH) 0.9 %
5-40 SYRINGE (ML) INJECTION EVERY 12 HOURS SCHEDULED
Status: CANCELLED | OUTPATIENT
Start: 2025-04-06

## 2025-04-06 RX ORDER — SODIUM CHLORIDE 0.9 % (FLUSH) 0.9 %
5-40 SYRINGE (ML) INJECTION PRN
Status: CANCELLED | OUTPATIENT
Start: 2025-04-06

## 2025-04-07 ENCOUNTER — ANESTHESIA EVENT (OUTPATIENT)
Dept: OPERATING ROOM | Age: 55
End: 2025-04-07
Payer: COMMERCIAL

## 2025-04-07 ENCOUNTER — ANESTHESIA (OUTPATIENT)
Dept: OPERATING ROOM | Age: 55
End: 2025-04-07
Payer: COMMERCIAL

## 2025-04-07 ENCOUNTER — HOSPITAL ENCOUNTER (OUTPATIENT)
Age: 55
Setting detail: OUTPATIENT SURGERY
Discharge: HOME | End: 2025-04-07
Attending: STUDENT IN AN ORGANIZED HEALTH CARE EDUCATION/TRAINING PROGRAM | Admitting: STUDENT IN AN ORGANIZED HEALTH CARE EDUCATION/TRAINING PROGRAM
Payer: COMMERCIAL

## 2025-04-07 VITALS
TEMPERATURE: 98 F | HEIGHT: 75 IN | BODY MASS INDEX: 28.6 KG/M2 | RESPIRATION RATE: 16 BRPM | OXYGEN SATURATION: 96 % | DIASTOLIC BLOOD PRESSURE: 90 MMHG | SYSTOLIC BLOOD PRESSURE: 137 MMHG | WEIGHT: 230 LBS | HEART RATE: 62 BPM

## 2025-04-07 DIAGNOSIS — R97.20 ELEVATED PROSTATE SPECIFIC ANTIGEN (PSA): ICD-10-CM

## 2025-04-07 PROCEDURE — 2580000003 HC RX 258

## 2025-04-07 PROCEDURE — 3700000000 HC ANESTHESIA ATTENDED CARE: Performed by: STUDENT IN AN ORGANIZED HEALTH CARE EDUCATION/TRAINING PROGRAM

## 2025-04-07 PROCEDURE — 7100000010 HC PHASE II RECOVERY - FIRST 15 MIN: Performed by: STUDENT IN AN ORGANIZED HEALTH CARE EDUCATION/TRAINING PROGRAM

## 2025-04-07 PROCEDURE — 6360000002 HC RX W HCPCS: Performed by: NURSE PRACTITIONER

## 2025-04-07 PROCEDURE — 2709999900 HC NON-CHARGEABLE SUPPLY: Performed by: STUDENT IN AN ORGANIZED HEALTH CARE EDUCATION/TRAINING PROGRAM

## 2025-04-07 PROCEDURE — 3600000012 HC SURGERY LEVEL 2 ADDTL 15MIN: Performed by: STUDENT IN AN ORGANIZED HEALTH CARE EDUCATION/TRAINING PROGRAM

## 2025-04-07 PROCEDURE — 3700000001 HC ADD 15 MINUTES (ANESTHESIA): Performed by: STUDENT IN AN ORGANIZED HEALTH CARE EDUCATION/TRAINING PROGRAM

## 2025-04-07 PROCEDURE — 2500000003 HC RX 250 WO HCPCS: Performed by: NURSE PRACTITIONER

## 2025-04-07 PROCEDURE — 88305 TISSUE EXAM BY PATHOLOGIST: CPT

## 2025-04-07 PROCEDURE — 88344 IMHCHEM/IMCYTCHM EA MLT ANTB: CPT

## 2025-04-07 PROCEDURE — 6360000002 HC RX W HCPCS

## 2025-04-07 PROCEDURE — 3600000002 HC SURGERY LEVEL 2 BASE: Performed by: STUDENT IN AN ORGANIZED HEALTH CARE EDUCATION/TRAINING PROGRAM

## 2025-04-07 PROCEDURE — 7100000011 HC PHASE II RECOVERY - ADDTL 15 MIN: Performed by: STUDENT IN AN ORGANIZED HEALTH CARE EDUCATION/TRAINING PROGRAM

## 2025-04-07 PROCEDURE — 6360000002 HC RX W HCPCS: Performed by: STUDENT IN AN ORGANIZED HEALTH CARE EDUCATION/TRAINING PROGRAM

## 2025-04-07 RX ORDER — SODIUM CHLORIDE 0.9 % (FLUSH) 0.9 %
5-40 SYRINGE (ML) INJECTION PRN
Status: DISCONTINUED | OUTPATIENT
Start: 2025-04-07 | End: 2025-04-07 | Stop reason: HOSPADM

## 2025-04-07 RX ORDER — SODIUM CHLORIDE 9 MG/ML
INJECTION, SOLUTION INTRAVENOUS PRN
Status: DISCONTINUED | OUTPATIENT
Start: 2025-04-07 | End: 2025-04-07 | Stop reason: HOSPADM

## 2025-04-07 RX ORDER — SODIUM CHLORIDE 9 MG/ML
INJECTION, SOLUTION INTRAVENOUS
Status: DISCONTINUED | OUTPATIENT
Start: 2025-04-07 | End: 2025-04-07 | Stop reason: SDUPTHER

## 2025-04-07 RX ORDER — SODIUM CHLORIDE 9 MG/ML
INJECTION, SOLUTION INTRAVENOUS CONTINUOUS
Status: DISCONTINUED | OUTPATIENT
Start: 2025-04-07 | End: 2025-04-07 | Stop reason: HOSPADM

## 2025-04-07 RX ORDER — LIDOCAINE HYDROCHLORIDE 20 MG/ML
INJECTION, SOLUTION EPIDURAL; INFILTRATION; INTRACAUDAL; PERINEURAL
Status: DISCONTINUED | OUTPATIENT
Start: 2025-04-07 | End: 2025-04-07 | Stop reason: SDUPTHER

## 2025-04-07 RX ORDER — PROPOFOL 10 MG/ML
INJECTION, EMULSION INTRAVENOUS
Status: DISCONTINUED | OUTPATIENT
Start: 2025-04-07 | End: 2025-04-07 | Stop reason: SDUPTHER

## 2025-04-07 RX ORDER — MIDAZOLAM HYDROCHLORIDE 1 MG/ML
INJECTION, SOLUTION INTRAMUSCULAR; INTRAVENOUS
Status: DISCONTINUED | OUTPATIENT
Start: 2025-04-07 | End: 2025-04-07 | Stop reason: SDUPTHER

## 2025-04-07 RX ORDER — ONDANSETRON 2 MG/ML
INJECTION INTRAMUSCULAR; INTRAVENOUS
Status: DISCONTINUED | OUTPATIENT
Start: 2025-04-07 | End: 2025-04-07 | Stop reason: SDUPTHER

## 2025-04-07 RX ORDER — SODIUM CHLORIDE 0.9 % (FLUSH) 0.9 %
5-40 SYRINGE (ML) INJECTION EVERY 12 HOURS SCHEDULED
Status: DISCONTINUED | OUTPATIENT
Start: 2025-04-07 | End: 2025-04-07 | Stop reason: HOSPADM

## 2025-04-07 RX ORDER — FENTANYL CITRATE 50 UG/ML
INJECTION, SOLUTION INTRAMUSCULAR; INTRAVENOUS
Status: DISCONTINUED | OUTPATIENT
Start: 2025-04-07 | End: 2025-04-07 | Stop reason: SDUPTHER

## 2025-04-07 RX ORDER — LIDOCAINE HYDROCHLORIDE 10 MG/ML
INJECTION, SOLUTION EPIDURAL; INFILTRATION; INTRACAUDAL; PERINEURAL PRN
Status: DISCONTINUED | OUTPATIENT
Start: 2025-04-07 | End: 2025-04-07 | Stop reason: ALTCHOICE

## 2025-04-07 RX ADMIN — WATER 2000 MG: 1 INJECTION INTRAMUSCULAR; INTRAVENOUS; SUBCUTANEOUS at 07:37

## 2025-04-07 RX ADMIN — PROPOFOL 50 MG: 10 INJECTION, EMULSION INTRAVENOUS at 07:44

## 2025-04-07 RX ADMIN — SODIUM CHLORIDE: 9 INJECTION, SOLUTION INTRAVENOUS at 06:47

## 2025-04-07 RX ADMIN — LIDOCAINE HYDROCHLORIDE 40 MG: 20 INJECTION, SOLUTION EPIDURAL; INFILTRATION; INTRACAUDAL; PERINEURAL at 07:44

## 2025-04-07 RX ADMIN — ONDANSETRON 4 MG: 2 INJECTION, SOLUTION INTRAMUSCULAR; INTRAVENOUS at 07:44

## 2025-04-07 RX ADMIN — PROPOFOL 150 MCG/KG/MIN: 10 INJECTION, EMULSION INTRAVENOUS at 07:45

## 2025-04-07 RX ADMIN — MIDAZOLAM 2 MG: 1 INJECTION INTRAMUSCULAR; INTRAVENOUS at 07:37

## 2025-04-07 RX ADMIN — FENTANYL CITRATE 50 MCG: 50 INJECTION, SOLUTION INTRAMUSCULAR; INTRAVENOUS at 07:44

## 2025-04-07 ASSESSMENT — PAIN - FUNCTIONAL ASSESSMENT
PAIN_FUNCTIONAL_ASSESSMENT: ACTIVITIES ARE NOT PREVENTED
PAIN_FUNCTIONAL_ASSESSMENT: 0-10

## 2025-04-07 ASSESSMENT — LIFESTYLE VARIABLES: SMOKING_STATUS: 0

## 2025-04-07 NOTE — DISCHARGE INSTRUCTIONS
Endoscope was pre-cleaned at the bedside immediately following procedure by Sayra Loja ET    Medications     Medication Rate/Dose/Volume Action Route Date Time   Administering User Audit    lidocaine (PF) 2% (mg) 100 mg Given IntraVENous 09/30/20  1429  Mesadieu, Evens, CRNA     propofol 10 mg/mL (mg) 50 mg Given IntraVENous 09/30/20  1429  Mesadieu, Evens, CRNA      50 mg Given IntraVENous  1431  Mesadieu, Evens, CRNA      50 mg Given IntraVENous  1433  Mesadieu, Evens, CRNA      60 mg Given IntraVENous  1434  Mesadieu, Evens, CRNA      50 mg Given IntraVENous  1435  Mesadieu, Evens, CRNA      50 mg Given IntraVENous  1436  Mesadieu, Evens, CRNA      50 mg Given IntraVENous  1438  Mesadieu, Evens, CRNA     0.9% sodium chloride infusion (mL) 500 mL Stopped IntraVENous 09/30/20  1439  Mesadieu, Evens, CRNA         Glasses returned to patient  . You underwent a transperineal fusion biopsy of the prostate.    It is normal to have blood in your urine and your semen for up to a month.  You do not require any other antibiotics at this time and you did get antibiotics in the operative suite    Biopsy results usually take 7 to 10 days.  You will be scheduled appropriately with your physician for follow-up    Should you have any fevers or chills please do not hesitate to call the office  He will have some tenderness in the perineal area for up to a week however this should subside.    If you have any pain after surgery Tylenol should feel you need for pain control    You were okay to restart your blood thinners 3 days after your biopsy

## 2025-04-07 NOTE — OP NOTE
Operative Note      Patient: Gustavo Ford  YOB: 1970  MRN: [id number]    Date of Procedure: 4/7/2025    Pre-Op Diagnosis Codes:      * Elevated prostate specific antigen (PSA) [R97.20]    Post-Op Diagnosis: Same       Procedure(s):  TRANSPERINEAL ULTRASOUND GUIDED MRI FUSION PROSTATE BIOPSY     Surgeon(s):  Curt Goodman MD    Assistant:   * No surgical staff found *    Anesthesia: Monitor Anesthesia Care    Estimated Blood Loss (mL): Minimal    Complications: None    Specimens:   ID Type Source Tests Collected by Time Destination   A : RIGHT ANTERIOR APEX BIOPSY Tissue Prostate SURGICAL PATHOLOGY Curt Goodman MD 4/7/2025 0744    B : RIGHT ANTERIOR BASE PIOPSY Tissue Prostate SURGICAL PATHOLOGY Curt Goodman MD 4/7/2025 0744    C : RIGHT POSTERIOR APEX BIOPSY Tissue Prostate SURGICAL PATHOLOGY Curt Goodman MD 4/7/2025 0744    D : RIGHT POSTERIOR BASE BIOPSY Tissue Prostate SURGICAL PATHOLOGY Curt Goodman MD 4/7/2025 0744    E : RIGHT LATERAL BIOPSY Tissue Prostate SURGICAL PATHOLOGY Curt Goodman MD 4/7/2025 0744    F : MIDLINE APEX BIOPSY Tissue Prostate SURGICAL PATHOLOGY Curt Goodman MD 4/7/2025 0744    G : MIDLINE BASE BIOPSY Tissue Prostate SURGICAL PATHOLOGY Curt Goodman MD 4/7/2025 0744    H : LEFT ANTERIOR APEX BIOPSY Tissue Prostate SURGICAL PATHOLOGY Curt Goodman MD 4/7/2025 0744    I : LEFT ANTERIOR BASE BIOPSY Tissue Prostate SURGICAL PATHOLOGY Curt Goodman MD 4/7/2025 0744    J : LEFT POSTERIOR APEX BIOPSY Tissue Prostate SURGICAL PATHOLOGY Curt Goodman MD 4/7/2025 0744    K : LEFT POSTERIOR BASE BIOPSY Tissue Prostate SURGICAL PATHOLOGY Curt Goodman MD 4/7/2025 0744    L : LEFT LATERAL BIOPSY Tissue Prostate SURGICAL PATHOLOGY Curt Goodman MD 4/7/2025 0744    M : LESION ONE Tissue Prostate SURGICAL PATHOLOGY Curt Goodman MD 4/7/2025 0750    N : LESION TWO Tissue Prostate SURGICAL PATHOLOGY Curt Goodman MD 4/7/2025 0750        Implants:  * No implants in log *

## 2025-04-07 NOTE — ANESTHESIA PRE PROCEDURE
for: \"ABORH\", \"LABANTI\"    Drug/Infectious Status (If Applicable):  No results found for: \"HIV\", \"HEPCAB\"    COVID-19 Screening (If Applicable): No results found for: \"COVID19\"        Anesthesia Evaluation  Patient summary reviewed   no history of anesthetic complications:   Airway: Mallampati: II  TM distance: >3 FB   Neck ROM: full  Mouth opening: > = 3 FB   Dental:      Comment: Vaneer and permanent implant front teeth   Denies any missing, loose, chipped, cracked, or removable dentition     Pulmonary:       (-) not a current smoker                           Cardiovascular:Negative CV ROS  Exercise tolerance: good (>4 METS)          Rhythm: regular             Beta Blocker:  Not on Beta Blocker         Neuro/Psych:   Negative Neuro/Psych ROS              GI/Hepatic/Renal: Neg GI/Hepatic/Renal ROS            Endo/Other:                      ROS comment: Elevated PSA  Abdominal:             Vascular: negative vascular ROS.         Other Findings:         Anesthesia Plan      MAC     ASA 2       Induction: intravenous.      Anesthetic plan and risks discussed with patient.      Plan discussed with CRNA and attending.                  DARLINE DE DIOS RN   4/7/2025

## 2025-04-07 NOTE — ANESTHESIA POSTPROCEDURE EVALUATION
Department of Anesthesiology  Postprocedure Note    Patient: Gustavo Ford  MRN: [id number]  YOB: 1970  Date of evaluation: 4/7/2025    Procedure Summary       Date: 04/07/25 Room / Location: [address]    Anesthesia Start: 0734 Anesthesia Stop:     Procedure: TRANSPERINEAL ULTRASOUND GUIDED MRI FUSION PROSTATE BIOPSY ++FORTEC++ (Perineum) Diagnosis:       Elevated prostate specific antigen (PSA)      (Elevated prostate specific antigen (PSA) [R97.20])    Surgeons: Curt Goodman MD Responsible Provider: Genesis Umanzor DO    Anesthesia Type: MAC ASA Status: 2            Anesthesia Type: No value filed.    Sanjiv Phase I: Sanjiv Score: 10    Sanjiv Phase II:      Anesthesia Post Evaluation    Patient location during evaluation: PACU  Patient participation: complete - patient participated  Level of consciousness: awake  Airway patency: patent  Nausea & Vomiting: no nausea and no vomiting  Cardiovascular status: hemodynamically stable  Respiratory status: acceptable  Hydration status: euvolemic  Pain management: adequate        No notable events documented.

## 2025-04-07 NOTE — H&P
Gustavo Ford is a 54 y.o. male with elevated PSA   Here for fusion biopsy   Please see paper h and p for full details     Past Medical History:   Diagnosis Date    Elevated PSA        Past Surgical History:   Procedure Laterality Date    COLONOSCOPY N/A 11/29/2022    COLONOSCOPY POLYPECTOMY SNARE with CAUTERY performed by Norris Robertson MD at [facility] ENDOSCOPY    HERNIA REPAIR Bilateral 8/17/2022    LAPAROSCOPIC ROBOTIC XI ASSISTED BILATERAL INGUINAL HERNIA REPAIR WITH MESH performed by Norris Robertson MD at [facility] OR    VASECTOMY         History reviewed. No pertinent family history.    Prior to Admission medications    Not on File        Allergies: Other    Social History     Tobacco Use    Smoking status: Never    Smokeless tobacco: Never   Substance Use Topics    Alcohol use: Yes     Comment: occasional        Review of Systems:  Respiratory: negative for cough and hemoptysis  Cardiovascular: negative for chest pain and dyspnea  Gastrointestinal: negative for abdominal pain, diarrhea, nausea and vomiting  Genitourinary: as per HPI, otherwise negative.  Derm: negative for rash and skin lesion(s)  Neurological: negative for seizures and tremors  Endocrine: negative for diabetic symptoms including polydipsia and polyuria  All other systems negative    Physical Exam:  Vitals:    04/07/25 0645   BP: (!) 139/97   Pulse: 67   Resp: 16   Temp: 97.9 °F (36.6 °C)   SpO2: 95%      Skin:  Warm and dry.  No rash or bruises  HEENT:  PERRLA, EOMI  Neck:  No JVD, No thyromegaly  Cardiac:  RRR  Lungs:  No audible wheezes, symmetric respirations, non-labored  Abdomen: Soft, non-tender, non-distended  Extremities:  No clubbing, edema or cyanosis  Neurological:  Moves all extremities, normal DTR    Lab Results   Component Value Date    WBC 10.4 12/25/2019    HGB 16.5 12/25/2019    HCT 48.5 12/25/2019    MCV 92.0 12/25/2019     12/25/2019       Lab Results   Component Value Date    CREATININE 1.0 07/03/2023       Lab

## 2025-04-14 LAB — SURGICAL PATHOLOGY REPORT: NORMAL

## 2025-09-01 ASSESSMENT — PATIENT HEALTH QUESTIONNAIRE - PHQ9
1. LITTLE INTEREST OR PLEASURE IN DOING THINGS: NOT AT ALL
SUM OF ALL RESPONSES TO PHQ QUESTIONS 1-9: 0
1. LITTLE INTEREST OR PLEASURE IN DOING THINGS: NOT AT ALL
SUM OF ALL RESPONSES TO PHQ9 QUESTIONS 1 & 2: 0
2. FEELING DOWN, DEPRESSED OR HOPELESS: NOT AT ALL
2. FEELING DOWN, DEPRESSED OR HOPELESS: NOT AT ALL
SUM OF ALL RESPONSES TO PHQ QUESTIONS 1-9: 0

## 2025-09-04 ENCOUNTER — OFFICE VISIT (OUTPATIENT)
Dept: PRIMARY CARE CLINIC | Age: 55
End: 2025-09-04
Payer: COMMERCIAL

## 2025-09-04 VITALS
WEIGHT: 237 LBS | TEMPERATURE: 97.3 F | HEIGHT: 75 IN | BODY MASS INDEX: 29.47 KG/M2 | RESPIRATION RATE: 16 BRPM | OXYGEN SATURATION: 98 % | HEART RATE: 80 BPM | SYSTOLIC BLOOD PRESSURE: 132 MMHG | DIASTOLIC BLOOD PRESSURE: 74 MMHG

## 2025-09-04 DIAGNOSIS — Z00.00 ENCOUNTER FOR WELL ADULT EXAM WITHOUT ABNORMAL FINDINGS: Primary | ICD-10-CM

## 2025-09-04 PROCEDURE — 99396 PREV VISIT EST AGE 40-64: CPT | Performed by: FAMILY MEDICINE

## 2025-09-04 RX ORDER — PREDNISONE 20 MG/1
20 TABLET ORAL 2 TIMES DAILY
Qty: 10 TABLET | Refills: 0 | Status: SHIPPED | OUTPATIENT
Start: 2025-09-04 | End: 2025-09-09

## 2025-09-04 RX ORDER — HYDROXYZINE HYDROCHLORIDE 25 MG/1
25 TABLET, FILM COATED ORAL EVERY 8 HOURS PRN
Qty: 30 TABLET | Refills: 0 | Status: SHIPPED | OUTPATIENT
Start: 2025-09-04 | End: 2025-09-14

## 2025-09-04 RX ORDER — FLUCONAZOLE 100 MG/1
100 TABLET ORAL DAILY
Qty: 7 TABLET | Refills: 0 | Status: SHIPPED | OUTPATIENT
Start: 2025-09-04 | End: 2025-09-11

## 2025-09-04 SDOH — ECONOMIC STABILITY: FOOD INSECURITY: WITHIN THE PAST 12 MONTHS, YOU WORRIED THAT YOUR FOOD WOULD RUN OUT BEFORE YOU GOT MONEY TO BUY MORE.: NEVER TRUE

## 2025-09-04 SDOH — ECONOMIC STABILITY: FOOD INSECURITY: WITHIN THE PAST 12 MONTHS, THE FOOD YOU BOUGHT JUST DIDN'T LAST AND YOU DIDN'T HAVE MONEY TO GET MORE.: NEVER TRUE

## 2025-09-04 ASSESSMENT — ENCOUNTER SYMPTOMS
APNEA: 0
BLOOD IN STOOL: 0
WHEEZING: 0
SHORTNESS OF BREATH: 0
COUGH: 0
SORE THROAT: 0
CONSTIPATION: 0
VOMITING: 0
EYE PAIN: 0
EYE ITCHING: 0
ABDOMINAL PAIN: 0
SINUS PRESSURE: 0
CHEST TIGHTNESS: 0
NAUSEA: 0
RHINORRHEA: 0
BACK PAIN: 0
EYE REDNESS: 0
COLOR CHANGE: 0
DIARRHEA: 0

## (undated) DEVICE — INTENDED FOR TISSUE SEPARATION, AND OTHER PROCEDURES THAT REQUIRE A SHARP SURGICAL BLADE TO PUNCTURE OR CUT.: Brand: BARD-PARKER ® STAINLESS STEEL BLADES

## (undated) DEVICE — TOWEL,OR,DSP,ST,BLUE,STD,6/PK,12PK/CS: Brand: MEDLINE

## (undated) DEVICE — STANDARD HYPODERMIC NEEDLE,ALUMINUM HUB: Brand: MONOJECT

## (undated) DEVICE — SKN PREP SPNG STKS PVP PNT STR: Brand: MEDLINE INDUSTRIES, INC.

## (undated) DEVICE — DRAPE,LAP,CHOLE,W/TROUGHS,STERILE: Brand: MEDLINE

## (undated) DEVICE — BLADELESS OBTURATOR: Brand: WECK VISTA

## (undated) DEVICE — KIT,ANTI FOG,W/SPONGE & FLUID,SOFT PACK: Brand: MEDLINE

## (undated) DEVICE — COVER,MAYO STAND,STERILE: Brand: MEDLINE

## (undated) DEVICE — SOLUTION IV IRRIG POUR BRL 0.9% SODIUM CHL 2F7124

## (undated) DEVICE — WARMER SCP 2 ANTIFOG LAP DISP

## (undated) DEVICE — SUPPORT SCROT L AD L39-44IN SFT KNIT PCH SUSP WAISTBAND

## (undated) DEVICE — FORCE BIPOLAR: Brand: ENDOWRIST

## (undated) DEVICE — FORCEPS BX L240CM JAW DIA2.4MM ORNG L CAP W/ NDL DISP RAD

## (undated) DEVICE — TOTAL TRAY, DB, 100% SILI FOLEY, 16FR 10: Brand: MEDLINE

## (undated) DEVICE — INSUFFLATION TUBING SET WITH FILTER, FUNNEL CONNECTOR AND LUER LOCK: Brand: JOSNOE MEDICAL INC

## (undated) DEVICE — INSUFFLATION NEEDLE TO ESTABLISH PNEUMOPERITONEUM.: Brand: INSUFFLATION NEEDLE

## (undated) DEVICE — MARKER,SKIN,WI/RULER AND LABELS: Brand: MEDLINE

## (undated) DEVICE — GRADUATE TRIANG MEASURE 1000ML BLK PRNT

## (undated) DEVICE — TIP COVER ACCESSORY

## (undated) DEVICE — COLUMN DRAPE

## (undated) DEVICE — ELECTRODE PT RET AD L9FT HI MOIST COND ADH HYDRGEL CORDED

## (undated) DEVICE — ARM DRAPE

## (undated) DEVICE — GLOVE SURG SZ 7 L12IN FNGR THK94MIL TRNSLUC YEL LTX HYDRGEL

## (undated) DEVICE — PACK PROCEDURE SURG GEN CUST

## (undated) DEVICE — SPONGE GZ W4XL4IN RAYON POLY FILL CVR W/ NONWOVEN FAB

## (undated) DEVICE — ADHESIVE SKIN CLSR 0.7ML TOP DERMBND ADV

## (undated) DEVICE — NEEDLE SPNL 22GA L7IN BLK HUB S STL W/ QNCKE PNT W/OUT

## (undated) DEVICE — NEEDLE HYPO 25GA L1.5IN BLU POLYPR HUB S STL REG BVL STR

## (undated) DEVICE — CHLORAPREP 26ML ORANGE

## (undated) DEVICE — SNARE ENDOSCP L240CM LOOP W13MM SHTH DIA2.4MM SM OVL FLX

## (undated) DEVICE — SUTURE V-LOC 180 SZ 2-0 L9IN ABSRB GRN L27MM GS-22 1/2 CIR VLOCL2145

## (undated) DEVICE — MAX-CORE® DISPOSABLE CORE BIOPSY INSTRUMENT, 18G X 25CM: Brand: MAX-CORE

## (undated) DEVICE — DOUBLE BASIN SET: Brand: MEDLINE INDUSTRIES, INC.

## (undated) DEVICE — SYRINGE 20ML LL S/C 50

## (undated) DEVICE — SYRINGE MED 10ML LUERLOCK TIP W/O SFTY DISP

## (undated) DEVICE — DRAPE,REIN 53X77,STERILE: Brand: MEDLINE

## (undated) DEVICE — MONOPOLAR CURVED SCISSORS: Brand: ENDOWRIST

## (undated) DEVICE — GOWN,SIRUS,FABRNF,XL,20/CS: Brand: MEDLINE

## (undated) DEVICE — SUTURE VCRL SZ 3-0 L27IN ABSRB UD L26MM SH 1/2 CIR J416H

## (undated) DEVICE — MEGA SUTURECUT ND: Brand: ENDOWRIST

## (undated) DEVICE — TRAP POLYP ETRAP